# Patient Record
Sex: FEMALE | Race: BLACK OR AFRICAN AMERICAN | NOT HISPANIC OR LATINO | Employment: OTHER | URBAN - METROPOLITAN AREA
[De-identification: names, ages, dates, MRNs, and addresses within clinical notes are randomized per-mention and may not be internally consistent; named-entity substitution may affect disease eponyms.]

---

## 2020-02-05 LAB
HUMAN PAPILLOMAVIRUS (HPV): NORMAL
HUMAN PAPILLOMAVIRUS (HPV): NORMAL

## 2020-02-06 ENCOUNTER — OFFICE VISIT (OUTPATIENT)
Dept: FAMILY MEDICINE | Facility: CLINIC | Age: 65
End: 2020-02-06
Payer: COMMERCIAL

## 2020-02-06 VITALS
HEART RATE: 80 BPM | TEMPERATURE: 98 F | OXYGEN SATURATION: 98 % | DIASTOLIC BLOOD PRESSURE: 82 MMHG | BODY MASS INDEX: 29.31 KG/M2 | HEIGHT: 63 IN | WEIGHT: 165.44 LBS | SYSTOLIC BLOOD PRESSURE: 124 MMHG

## 2020-02-06 DIAGNOSIS — A60.00 GENITAL HERPES SIMPLEX, UNSPECIFIED SITE: ICD-10-CM

## 2020-02-06 DIAGNOSIS — Z00.00 ANNUAL PHYSICAL EXAM: ICD-10-CM

## 2020-02-06 DIAGNOSIS — E78.2 HYPERLIPIDEMIA, MIXED: ICD-10-CM

## 2020-02-06 DIAGNOSIS — I10 ESSENTIAL HYPERTENSION: Primary | ICD-10-CM

## 2020-02-06 DIAGNOSIS — Z12.11 SCREEN FOR COLON CANCER: ICD-10-CM

## 2020-02-06 PROCEDURE — 99999 PR PBB SHADOW E&M-NEW PATIENT-LVL III: CPT | Mod: PBBFAC,,, | Performed by: INTERNAL MEDICINE

## 2020-02-06 PROCEDURE — 99999 PR PBB SHADOW E&M-NEW PATIENT-LVL III: ICD-10-PCS | Mod: PBBFAC,,, | Performed by: INTERNAL MEDICINE

## 2020-02-06 PROCEDURE — 99386 PREV VISIT NEW AGE 40-64: CPT | Mod: S$GLB,,, | Performed by: INTERNAL MEDICINE

## 2020-02-06 PROCEDURE — 99386 PR PREVENTIVE VISIT,NEW,40-64: ICD-10-PCS | Mod: S$GLB,,, | Performed by: INTERNAL MEDICINE

## 2020-02-06 RX ORDER — ACYCLOVIR 400 MG/1
TABLET ORAL 2 TIMES DAILY
COMMUNITY
End: 2020-02-06 | Stop reason: SDUPTHER

## 2020-02-06 RX ORDER — ATORVASTATIN CALCIUM 10 MG/1
10 TABLET, FILM COATED ORAL DAILY
COMMUNITY
End: 2020-02-06 | Stop reason: SDUPTHER

## 2020-02-06 RX ORDER — ATORVASTATIN CALCIUM 10 MG/1
10 TABLET, FILM COATED ORAL DAILY
Qty: 90 TABLET | Refills: 3 | Status: SHIPPED | OUTPATIENT
Start: 2020-02-06 | End: 2021-01-20 | Stop reason: SDUPTHER

## 2020-02-06 RX ORDER — ACYCLOVIR 400 MG/1
400 TABLET ORAL 2 TIMES DAILY
Qty: 60 TABLET | Refills: 11 | Status: SHIPPED | OUTPATIENT
Start: 2020-02-06 | End: 2021-02-09

## 2020-02-06 RX ORDER — AMLODIPINE BESYLATE 10 MG/1
10 TABLET ORAL DAILY
COMMUNITY
End: 2020-02-06 | Stop reason: SDUPTHER

## 2020-02-06 RX ORDER — AMLODIPINE BESYLATE 5 MG/1
5 TABLET ORAL DAILY
Qty: 90 TABLET | Refills: 3 | Status: SHIPPED | OUTPATIENT
Start: 2020-02-06 | End: 2021-01-20 | Stop reason: SDUPTHER

## 2020-02-06 RX ORDER — HYDROCORTISONE 25 MG/G
CREAM TOPICAL 2 TIMES DAILY
Qty: 28 G | Refills: 11 | Status: SHIPPED | OUTPATIENT
Start: 2020-02-06 | End: 2021-08-10

## 2020-02-06 NOTE — PROGRESS NOTES
[facility] Note    Chief Complaint      Chief Complaint   Patient presents with    Establish Care       History of Present Illness      Kierra Acosta is a 64 y.o. female with chronic conditions of HTN, HLD, genital herpes who presents today for: re-establish care from  and annual preventative visit.  Complains of vertigo episode  HTN: BP at goal on amlodipine as needed.  Checks BP every day and usually 120s/80s.    HLD: Controlled on atorvastatin.  LDL due for repeat.  Genital herpes: Uses acyclovir and hydrocortisone cream as needed.  Diet: Prepares own food mostly.  Needs to get back to limiting fatty foods and carbs.  Needs to drink more water.  Exercise: Plans to start riding bike.    Denies drinking and driving, drinking more than 4 drinks on occasion, drug use.     FLu shot declines.  Td declines.  Shingles vaccine declines.  Pneumonia vaccine due age 66.    Mammogram scheduled with Dr. Felder.  PAP smear UTD.  Cscope due with Dr. Azevedo.     Past Medical History:  History reviewed. No pertinent past medical history.    Past Surgical History:   has no past surgical history on file.    Family History:  family history includes Hyperlipidemia in her mother; Hypertension in her mother; No Known Problems in her father.     Social History:  Social History     Tobacco Use    Smoking status: Never Smoker   Substance Use Topics    Alcohol use: Yes    Drug use: Not on file       Review of Systems   Constitutional: Negative for chills, fever and malaise/fatigue.   Respiratory: Negative for shortness of breath.    Cardiovascular: Negative for chest pain.   Gastrointestinal: Negative for constipation, diarrhea, nausea and vomiting.   Skin: Negative for rash.   Neurological: Negative for weakness.        Medications:  Outpatient Encounter Medications as of 2/6/2020   Medication Sig Dispense Refill    acyclovir (ZOVIRAX) 400 MG tablet Take 1 tablet (400 mg total) by mouth 2 (two) times daily. 60  "tablet 11    amLODIPine (NORVASC) 5 MG tablet Take 1 tablet (5 mg total) by mouth once daily. 90 tablet 3    atorvastatin (LIPITOR) 10 MG tablet Take 1 tablet (10 mg total) by mouth once daily. 90 tablet 3    [DISCONTINUED] acyclovir (ZOVIRAX) 400 MG tablet Take by mouth 2 (two) times daily.      [DISCONTINUED] amLODIPine (NORVASC) 10 MG tablet Take 10 mg by mouth once daily.      [DISCONTINUED] atorvastatin (LIPITOR) 10 MG tablet Take 10 mg by mouth once daily.      hydrocortisone 2.5 % cream Apply topically 2 (two) times daily. 28 g 11     No facility-administered encounter medications on file as of 2/6/2020.        Allergies:  Review of patient's allergies indicates:   Allergen Reactions    Sulfamethoxazole-trimethoprim      Other reaction(s): Cutaneous eruption (morphologic abnormality)       Health Maintenance:    There is no immunization history on file for this patient.   Health Maintenance   Topic Date Due    Hepatitis C Screening  1955    Lipid Panel  1955    TETANUS VACCINE  11/08/1974    Pap Smear with HPV Cotest  11/08/1977    Mammogram  11/08/1996    Colonoscopy  11/08/2006        Physical Exam      Vital Signs  Temp: 97.9 °F (36.6 °C)  Temp src: Oral  Pulse: 80  SpO2: 98 %  BP: 124/82  BP Location: Right arm  Patient Position: Sitting  Pain Score: 0-No pain  Height and Weight  Height: 5' 3" (160 cm)  Weight: 75 kg (165 lb 7.3 oz)  BSA (Calculated - sq m): 1.83 sq meters  BMI (Calculated): 29.3  Weight in (lb) to have BMI = 25: 140.8]    Physical Exam   Constitutional: She appears well-developed and well-nourished.   HENT:   Head: Normocephalic and atraumatic.   Right Ear: External ear normal.   Left Ear: External ear normal.   Mouth/Throat: Oropharynx is clear and moist.   Eyes: Pupils are equal, round, and reactive to light. Conjunctivae and EOM are normal.   Neck: Carotid bruit is not present.   Cardiovascular: Normal rate, regular rhythm, normal heart sounds and intact " distal pulses.   No murmur heard.  Pulmonary/Chest: Effort normal and breath sounds normal. She has no wheezes. She has no rales.   Abdominal: Soft. Bowel sounds are normal. She exhibits no distension. There is no hepatosplenomegaly. There is no tenderness.   Musculoskeletal: She exhibits no edema.   Vitals reviewed.       Laboratory:  CBC:      CMP:        Invalid input(s): CREATININ  URINALYSIS:       LIPIDS:      TSH:      A1C:        Assessment/Plan     Kierra Acosta is a 64 y.o.female with:    1. Annual physical exam  - CBC auto differential; Future  - Comprehensive metabolic panel; Future  - Lipid panel; Future  - TSH; Future  - T4, free; Future  - CBC auto differential  - Comprehensive metabolic panel  - Lipid panel  - TSH  - T4, free  Discussed diet and exercise, vaccines and cancer screening, risk factors.  Screening labs ordered.     2. Essential hypertension  - amLODIPine (NORVASC) 5 MG tablet; Take 1 tablet (5 mg total) by mouth once daily.  Dispense: 90 tablet; Refill: 3  - CBC auto differential; Future  - Comprehensive metabolic panel; Future  - Lipid panel; Future  - TSH; Future  - T4, free; Future  - CBC auto differential  - Comprehensive metabolic panel  - Lipid panel  - TSH  - T4, free  Continue current meds.  Reducing amlodipine dose to give pt confidence to take more regularly.    3. Hyperlipidemia, mixed  - atorvastatin (LIPITOR) 10 MG tablet; Take 1 tablet (10 mg total) by mouth once daily.  Dispense: 90 tablet; Refill: 3  - CBC auto differential; Future  - Comprehensive metabolic panel; Future  - Lipid panel; Future  - TSH; Future  - T4, free; Future  - CBC auto differential  - Comprehensive metabolic panel  - Lipid panel  - TSH  - T4, free  Continue current meds.  Update labs  4. Genital herpes simplex, unspecified site  - acyclovir (ZOVIRAX) 400 MG tablet; Take 1 tablet (400 mg total) by mouth 2 (two) times daily.  Dispense: 60 tablet; Refill: 11  - hydrocortisone 2.5 % cream; Apply  topically 2 (two) times daily.  Dispense: 28 g; Refill: 11  Continue current meds.    5. Screen for colon cancer  - Ambulatory referral/consult to Gastroenterology; Future  Dr. Azevedo.    Chronic conditions status updated as per HPI.  Other than changes above, cont current medications and maintain follow up with specialists.  Return to clinic in 6 months.    Chon Sauer MD  [facility]

## 2020-02-08 LAB
ALBUMIN SERPL-MCNC: 4 G/DL (ref 3.6–5.1)
ALBUMIN/GLOB SERPL: 1.3 (CALC) (ref 1–2.5)
ALP SERPL-CCNC: 83 U/L (ref 37–153)
ALT SERPL-CCNC: 17 U/L (ref 6–29)
AST SERPL-CCNC: 18 U/L (ref 10–35)
BASOPHILS # BLD AUTO: 63 CELLS/UL (ref 0–200)
BASOPHILS NFR BLD AUTO: 0.9 %
BILIRUB SERPL-MCNC: 0.7 MG/DL (ref 0.2–1.2)
BUN SERPL-MCNC: 16 MG/DL (ref 7–25)
BUN/CREAT SERPL: NORMAL (CALC) (ref 6–22)
CALCIUM SERPL-MCNC: 9.6 MG/DL (ref 8.6–10.4)
CHLORIDE SERPL-SCNC: 102 MMOL/L (ref 98–110)
CHOLEST SERPL-MCNC: 195 MG/DL
CHOLEST/HDLC SERPL: 3.1 (CALC)
CO2 SERPL-SCNC: 31 MMOL/L (ref 20–32)
CREAT SERPL-MCNC: 0.79 MG/DL (ref 0.5–0.99)
EOSINOPHIL # BLD AUTO: 336 CELLS/UL (ref 15–500)
EOSINOPHIL NFR BLD AUTO: 4.8 %
ERYTHROCYTE [DISTWIDTH] IN BLOOD BY AUTOMATED COUNT: 14.2 % (ref 11–15)
GFRSERPLBLD MDRD-ARVRAT: 80 ML/MIN/1.73M2
GLOBULIN SER CALC-MCNC: 3 G/DL (CALC) (ref 1.9–3.7)
GLUCOSE SERPL-MCNC: 91 MG/DL (ref 65–99)
HCT VFR BLD AUTO: 41.7 % (ref 35–45)
HDLC SERPL-MCNC: 62 MG/DL
HGB BLD-MCNC: 13.7 G/DL (ref 11.7–15.5)
LDLC SERPL CALC-MCNC: 115 MG/DL (CALC)
LYMPHOCYTES # BLD AUTO: 2170 CELLS/UL (ref 850–3900)
LYMPHOCYTES NFR BLD AUTO: 31 %
MCH RBC QN AUTO: 27.5 PG (ref 27–33)
MCHC RBC AUTO-ENTMCNC: 32.9 G/DL (ref 32–36)
MCV RBC AUTO: 83.7 FL (ref 80–100)
MONOCYTES # BLD AUTO: 476 CELLS/UL (ref 200–950)
MONOCYTES NFR BLD AUTO: 6.8 %
NEUTROPHILS # BLD AUTO: 3955 CELLS/UL (ref 1500–7800)
NEUTROPHILS NFR BLD AUTO: 56.5 %
NONHDLC SERPL-MCNC: 133 MG/DL (CALC)
PLATELET # BLD AUTO: 254 THOUSAND/UL (ref 140–400)
PMV BLD REES-ECKER: 11.6 FL (ref 7.5–12.5)
POTASSIUM SERPL-SCNC: 4.8 MMOL/L (ref 3.5–5.3)
PROT SERPL-MCNC: 7 G/DL (ref 6.1–8.1)
RBC # BLD AUTO: 4.98 MILLION/UL (ref 3.8–5.1)
SODIUM SERPL-SCNC: 141 MMOL/L (ref 135–146)
TRIGL SERPL-MCNC: 82 MG/DL
TSH SERPL-ACNC: 2.07 MIU/L (ref 0.4–4.5)
WBC # BLD AUTO: 7 THOUSAND/UL (ref 3.8–10.8)

## 2020-02-13 ENCOUNTER — PATIENT OUTREACH (OUTPATIENT)
Dept: ADMINISTRATIVE | Facility: HOSPITAL | Age: 65
End: 2020-02-13

## 2020-05-08 DIAGNOSIS — Z12.39 BREAST CANCER SCREENING: ICD-10-CM

## 2020-06-26 ENCOUNTER — TELEPHONE (OUTPATIENT)
Dept: FAMILY MEDICINE | Facility: CLINIC | Age: 65
End: 2020-06-26

## 2020-06-26 NOTE — TELEPHONE ENCOUNTER
----- Message from Glenna Cunha sent at 6/26/2020  9:36 AM CDT -----  Contact: Lexii he/ Dr. Jaclyn Woodson   Office is requesting copy of labs results to be sent to fax . Please advise

## 2020-07-24 DIAGNOSIS — Z12.39 BREAST CANCER SCREENING: ICD-10-CM

## 2020-08-06 ENCOUNTER — OFFICE VISIT (OUTPATIENT)
Dept: FAMILY MEDICINE | Facility: CLINIC | Age: 65
End: 2020-08-06
Payer: COMMERCIAL

## 2020-08-06 VITALS
SYSTOLIC BLOOD PRESSURE: 132 MMHG | HEIGHT: 63 IN | DIASTOLIC BLOOD PRESSURE: 80 MMHG | BODY MASS INDEX: 29.46 KG/M2 | OXYGEN SATURATION: 98 % | WEIGHT: 166.25 LBS | TEMPERATURE: 98 F | HEART RATE: 80 BPM

## 2020-08-06 DIAGNOSIS — Z11.59 SCREENING FOR VIRAL DISEASE: ICD-10-CM

## 2020-08-06 DIAGNOSIS — Z11.4 SCREENING FOR HIV (HUMAN IMMUNODEFICIENCY VIRUS): ICD-10-CM

## 2020-08-06 DIAGNOSIS — Z00.00 ANNUAL PHYSICAL EXAM: ICD-10-CM

## 2020-08-06 DIAGNOSIS — A60.00 GENITAL HERPES SIMPLEX, UNSPECIFIED SITE: ICD-10-CM

## 2020-08-06 DIAGNOSIS — I10 ESSENTIAL HYPERTENSION: Primary | ICD-10-CM

## 2020-08-06 DIAGNOSIS — E78.2 HYPERLIPIDEMIA, MIXED: ICD-10-CM

## 2020-08-06 PROCEDURE — 99214 PR OFFICE/OUTPT VISIT, EST, LEVL IV, 30-39 MIN: ICD-10-PCS | Mod: S$GLB,,, | Performed by: INTERNAL MEDICINE

## 2020-08-06 PROCEDURE — 99999 PR PBB SHADOW E&M-EST. PATIENT-LVL IV: ICD-10-PCS | Mod: PBBFAC,,, | Performed by: INTERNAL MEDICINE

## 2020-08-06 PROCEDURE — 99999 PR PBB SHADOW E&M-EST. PATIENT-LVL IV: CPT | Mod: PBBFAC,,, | Performed by: INTERNAL MEDICINE

## 2020-08-06 PROCEDURE — 99214 OFFICE O/P EST MOD 30 MIN: CPT | Mod: S$GLB,,, | Performed by: INTERNAL MEDICINE

## 2020-08-06 RX ORDER — HYDROXYZINE HYDROCHLORIDE 25 MG/1
TABLET, FILM COATED ORAL
COMMUNITY
Start: 2020-05-29 | End: 2021-08-10

## 2020-08-06 RX ORDER — TRIAMCINOLONE ACETONIDE 1 MG/G
CREAM TOPICAL
COMMUNITY
Start: 2020-05-29

## 2020-08-06 RX ORDER — LEVOCETIRIZINE DIHYDROCHLORIDE 5 MG/1
5 TABLET, FILM COATED ORAL NIGHTLY PRN
COMMUNITY
Start: 2020-07-08 | End: 2023-02-07

## 2020-08-06 RX ORDER — CETIRIZINE HYDROCHLORIDE 10 MG/1
10 TABLET ORAL DAILY PRN
COMMUNITY
Start: 2020-07-27 | End: 2021-08-10

## 2020-08-06 NOTE — PROGRESS NOTES
[facility] Note    Chief Complaint      Chief Complaint   Patient presents with    Hypertension     6 month f/u       History of Present Illness      Kierra Acosta is a 64 y.o. female with chronic conditions of  who presents today for: follow up chronic conditions.  Has been following up with dermatologist Dr. Salgado and allergist Dr. Lyons for chronic urticaria.  On zyrtec/xyzal.  Has had allergy testing: cockroaches, dust mites, shrimp.    HTN: BP at goal on amlodipine.  HLD: Controlled on atorvastatin.   last check.  Genital herpes: Uses acyclovir and hydrocortisone cream as needed.  FLu shot declines.  Td declines.  Shingles vaccine declines.  Pneumonia vaccine due age 66.    Mammogram scheduled on 8/14/2020 with Dr. Felder.  PAP smear UTD.  Cscope UTD 2020 with Dr. Azevedo, requesting report.     Past Medical History:  History reviewed. No pertinent past medical history.    Past Surgical History:   has no past surgical history on file.    Family History:  family history includes Hyperlipidemia in her mother; Hypertension in her mother; No Known Problems in her father.     Social History:  Social History     Tobacco Use    Smoking status: Never Smoker   Substance Use Topics    Alcohol use: Yes     Alcohol/week: 0.0 standard drinks     Frequency: Monthly or less     Drinks per session: 1 or 2     Binge frequency: Never     Comment: May have 1-2. Do not drink every week. Beer/wine    Drug use: Never       Review of Systems   Constitutional: Negative for chills, fever and malaise/fatigue.   HENT: Negative for hearing loss.    Eyes: Negative for discharge.   Respiratory: Negative for shortness of breath and wheezing.    Cardiovascular: Negative for chest pain and palpitations.   Gastrointestinal: Negative for blood in stool, constipation, diarrhea, nausea and vomiting.   Genitourinary: Negative for dysuria and hematuria.   Musculoskeletal: Negative for neck pain.   Skin: Negative  "for rash.   Neurological: Negative for weakness and headaches.   Endo/Heme/Allergies: Negative for polydipsia.        Medications:  Outpatient Encounter Medications as of 8/6/2020   Medication Sig Dispense Refill    acyclovir (ZOVIRAX) 400 MG tablet Take 1 tablet (400 mg total) by mouth 2 (two) times daily. 60 tablet 11    amLODIPine (NORVASC) 5 MG tablet Take 1 tablet (5 mg total) by mouth once daily. 90 tablet 3    atorvastatin (LIPITOR) 10 MG tablet Take 1 tablet (10 mg total) by mouth once daily. 90 tablet 3    cetirizine (ZYRTEC) 10 MG tablet       hydrocortisone 2.5 % cream Apply topically 2 (two) times daily. 28 g 11    hydrOXYzine HCL (ATARAX) 25 MG tablet       levocetirizine (XYZAL) 5 MG tablet       triamcinolone acetonide 0.1% (KENALOG) 0.1 % cream MANAS EXT AA BID FOR UP TO TWO WEEKS TO RASH ON BODY       No facility-administered encounter medications on file as of 8/6/2020.        Allergies:  Review of patient's allergies indicates:   Allergen Reactions    Sulfamethoxazole-trimethoprim      Other reaction(s): Cutaneous eruption (morphologic abnormality)       Health Maintenance:    There is no immunization history on file for this patient.   Health Maintenance   Topic Date Due    Hepatitis C Screening  1955    TETANUS VACCINE  11/08/1974    Mammogram  11/08/1996    Pap Smear with HPV Cotest  02/05/2025    Lipid Panel  02/07/2025        Physical Exam      Vital Signs  Temp: 97.7 °F (36.5 °C)  Temp src: Oral  Pulse: 80  SpO2: 98 %  BP: 132/80  BP Location: Left arm  Patient Position: Sitting  Pain Score: 0-No pain  Height and Weight  Height: 5' 3" (160 cm)  Weight: 75.4 kg (166 lb 3.6 oz)  BSA (Calculated - sq m): 1.83 sq meters  BMI (Calculated): 29.5  Weight in (lb) to have BMI = 25: 140.8]    Physical Exam  Vitals signs reviewed.   Constitutional:       Appearance: She is well-developed.   HENT:      Head: Normocephalic and atraumatic.      Right Ear: External ear normal.      Left " Ear: External ear normal.   Eyes:      Conjunctiva/sclera: Conjunctivae normal.      Pupils: Pupils are equal, round, and reactive to light.   Neck:      Vascular: No carotid bruit.   Cardiovascular:      Rate and Rhythm: Normal rate and regular rhythm.      Heart sounds: Normal heart sounds. No murmur.   Pulmonary:      Effort: Pulmonary effort is normal.      Breath sounds: Normal breath sounds. No wheezing or rales.   Abdominal:      General: Bowel sounds are normal. There is no distension.      Palpations: Abdomen is soft.      Tenderness: There is no abdominal tenderness.          Laboratory:  CBC:  Recent Labs   Lab 02/07/20  0644   WBC 7.0   RBC 4.98   Hemoglobin 13.7   Hematocrit 41.7   Platelets 254   Mean Corpuscular Volume 83.7   Mean Corpuscular Hemoglobin 27.5   Mean Corpuscular Hemoglobin Conc 32.9     CMP:  Recent Labs   Lab 02/07/20  0644   Glucose 91   Calcium 9.6   Albumin 4.0   Total Protein 7.0   Sodium 141   Potassium 4.8   CO2 31   Chloride 102   BUN, Bld 16   Alkaline Phosphatase 83   ALT 17   AST 18   Total Bilirubin 0.7     URINALYSIS:       LIPIDS:  Recent Labs   Lab 02/07/20  0644   TSH 2.07   HDL 62   Cholesterol 195   Triglycerides 82   LDL Cholesterol 115 H   Hdl/Cholesterol Ratio 3.1   Non HDL Chol. (LDL+VLDL) 133 H     TSH:  Recent Labs   Lab 02/07/20  0644   TSH 2.07     A1C:        Assessment/Plan     Kierra Acosta is a 64 y.o.female with:    1. Essential hypertension  Continue current meds.    2. Hyperlipidemia, mixed  Continue current meds.  Labs due next visit.  3. Genital herpes simplex, unspecified site  Continue current meds as needed    Chronic conditions status updated as per HPI.  Other than changes above, cont current medications and maintain follow up with specialists.  Return to clinic in 6 months.    Chon Sauer MD  [facility]        Answers for HPI/ROS submitted by the patient on 8/3/2020   activity change: No  unexpected weight change: No  rhinorrhea:  No  trouble swallowing: No  visual disturbance: No  chest tightness: No  polyuria: No  difficulty urinating: No  menstrual problem: No  joint swelling: No  arthralgias: No  confusion: No  dysphoric mood: No

## 2020-08-10 ENCOUNTER — TELEPHONE (OUTPATIENT)
Dept: ADMINISTRATIVE | Facility: HOSPITAL | Age: 65
End: 2020-08-10

## 2020-08-26 ENCOUNTER — TELEPHONE (OUTPATIENT)
Dept: ADMINISTRATIVE | Facility: HOSPITAL | Age: 65
End: 2020-08-26

## 2020-08-26 ENCOUNTER — PATIENT OUTREACH (OUTPATIENT)
Dept: ADMINISTRATIVE | Facility: HOSPITAL | Age: 65
End: 2020-08-26

## 2020-08-27 ENCOUNTER — TELEPHONE (OUTPATIENT)
Dept: ADMINISTRATIVE | Facility: HOSPITAL | Age: 65
End: 2020-08-27

## 2020-10-05 ENCOUNTER — PATIENT MESSAGE (OUTPATIENT)
Dept: INTERNAL MEDICINE | Facility: CLINIC | Age: 65
End: 2020-10-05

## 2021-01-04 ENCOUNTER — PATIENT MESSAGE (OUTPATIENT)
Dept: ADMINISTRATIVE | Facility: HOSPITAL | Age: 66
End: 2021-01-04

## 2021-01-20 DIAGNOSIS — E78.2 HYPERLIPIDEMIA, MIXED: ICD-10-CM

## 2021-01-20 DIAGNOSIS — Z79.899 ENCOUNTER FOR LONG-TERM (CURRENT) USE OF OTHER MEDICATIONS: Primary | ICD-10-CM

## 2021-01-20 DIAGNOSIS — I10 ESSENTIAL HYPERTENSION: ICD-10-CM

## 2021-01-22 RX ORDER — ATORVASTATIN CALCIUM 10 MG/1
10 TABLET, FILM COATED ORAL DAILY
Qty: 90 TABLET | Refills: 0 | Status: SHIPPED | OUTPATIENT
Start: 2021-01-22 | End: 2021-04-20 | Stop reason: SDUPTHER

## 2021-01-22 RX ORDER — AMLODIPINE BESYLATE 5 MG/1
5 TABLET ORAL DAILY
Qty: 90 TABLET | Refills: 2 | Status: SHIPPED | OUTPATIENT
Start: 2021-01-22 | End: 2021-10-18

## 2021-02-09 ENCOUNTER — TELEPHONE (OUTPATIENT)
Dept: ADMINISTRATIVE | Facility: HOSPITAL | Age: 66
End: 2021-02-09

## 2021-02-09 ENCOUNTER — OFFICE VISIT (OUTPATIENT)
Dept: FAMILY MEDICINE | Facility: CLINIC | Age: 66
End: 2021-02-09
Payer: COMMERCIAL

## 2021-02-09 VITALS
BODY MASS INDEX: 28.79 KG/M2 | DIASTOLIC BLOOD PRESSURE: 80 MMHG | HEART RATE: 74 BPM | SYSTOLIC BLOOD PRESSURE: 132 MMHG | OXYGEN SATURATION: 96 % | TEMPERATURE: 98 F | HEIGHT: 63 IN | WEIGHT: 162.5 LBS | RESPIRATION RATE: 16 BRPM

## 2021-02-09 DIAGNOSIS — A60.00 GENITAL HERPES SIMPLEX, UNSPECIFIED SITE: ICD-10-CM

## 2021-02-09 DIAGNOSIS — E78.2 HYPERLIPIDEMIA, MIXED: ICD-10-CM

## 2021-02-09 DIAGNOSIS — Z00.00 ANNUAL PHYSICAL EXAM: Primary | ICD-10-CM

## 2021-02-09 DIAGNOSIS — Z11.59 SCREENING FOR VIRAL DISEASE: ICD-10-CM

## 2021-02-09 DIAGNOSIS — Z11.4 SCREENING FOR HIV (HUMAN IMMUNODEFICIENCY VIRUS): ICD-10-CM

## 2021-02-09 DIAGNOSIS — I10 ESSENTIAL HYPERTENSION: ICD-10-CM

## 2021-02-09 PROCEDURE — 99999 PR PBB SHADOW E&M-EST. PATIENT-LVL IV: CPT | Mod: PBBFAC,,, | Performed by: INTERNAL MEDICINE

## 2021-02-09 PROCEDURE — 99396 PR PREVENTIVE VISIT,EST,40-64: ICD-10-PCS | Mod: S$GLB,,, | Performed by: INTERNAL MEDICINE

## 2021-02-09 PROCEDURE — 99396 PREV VISIT EST AGE 40-64: CPT | Mod: S$GLB,,, | Performed by: INTERNAL MEDICINE

## 2021-02-09 PROCEDURE — 99999 PR PBB SHADOW E&M-EST. PATIENT-LVL IV: ICD-10-PCS | Mod: PBBFAC,,, | Performed by: INTERNAL MEDICINE

## 2021-02-09 RX ORDER — ACYCLOVIR 400 MG/1
400 TABLET ORAL DAILY
Qty: 90 TABLET | Refills: 3
Start: 2021-02-09 | End: 2021-08-02 | Stop reason: SDUPTHER

## 2021-02-17 ENCOUNTER — TELEPHONE (OUTPATIENT)
Dept: FAMILY MEDICINE | Facility: CLINIC | Age: 66
End: 2021-02-17

## 2021-04-20 DIAGNOSIS — E78.2 HYPERLIPIDEMIA, MIXED: ICD-10-CM

## 2021-04-20 RX ORDER — ATORVASTATIN CALCIUM 10 MG/1
10 TABLET, FILM COATED ORAL DAILY
Qty: 90 TABLET | Refills: 3 | Status: SHIPPED | OUTPATIENT
Start: 2021-04-20 | End: 2022-04-14

## 2021-08-02 DIAGNOSIS — A60.00 GENITAL HERPES SIMPLEX, UNSPECIFIED SITE: ICD-10-CM

## 2021-08-02 RX ORDER — ACYCLOVIR 400 MG/1
400 TABLET ORAL DAILY
Qty: 90 TABLET | Refills: 3
Start: 2021-08-02 | End: 2021-08-12 | Stop reason: SDUPTHER

## 2021-08-10 ENCOUNTER — OFFICE VISIT (OUTPATIENT)
Dept: FAMILY MEDICINE | Facility: CLINIC | Age: 66
End: 2021-08-10
Payer: COMMERCIAL

## 2021-08-10 VITALS
HEIGHT: 63 IN | BODY MASS INDEX: 28.97 KG/M2 | SYSTOLIC BLOOD PRESSURE: 130 MMHG | TEMPERATURE: 98 F | HEART RATE: 74 BPM | WEIGHT: 163.5 LBS | DIASTOLIC BLOOD PRESSURE: 88 MMHG | OXYGEN SATURATION: 99 %

## 2021-08-10 DIAGNOSIS — A60.00 GENITAL HERPES SIMPLEX, UNSPECIFIED SITE: ICD-10-CM

## 2021-08-10 DIAGNOSIS — I10 ESSENTIAL HYPERTENSION: Primary | ICD-10-CM

## 2021-08-10 DIAGNOSIS — E78.2 HYPERLIPIDEMIA, MIXED: ICD-10-CM

## 2021-08-10 PROCEDURE — 99214 OFFICE O/P EST MOD 30 MIN: CPT | Mod: S$GLB,,, | Performed by: INTERNAL MEDICINE

## 2021-08-10 PROCEDURE — 99999 PR PBB SHADOW E&M-EST. PATIENT-LVL III: CPT | Mod: PBBFAC,,, | Performed by: INTERNAL MEDICINE

## 2021-08-10 PROCEDURE — 99999 PR PBB SHADOW E&M-EST. PATIENT-LVL III: ICD-10-PCS | Mod: PBBFAC,,, | Performed by: INTERNAL MEDICINE

## 2021-08-10 PROCEDURE — 99214 PR OFFICE/OUTPT VISIT, EST, LEVL IV, 30-39 MIN: ICD-10-PCS | Mod: S$GLB,,, | Performed by: INTERNAL MEDICINE

## 2021-08-11 ENCOUNTER — TELEPHONE (OUTPATIENT)
Dept: FAMILY MEDICINE | Facility: CLINIC | Age: 66
End: 2021-08-11

## 2021-08-12 DIAGNOSIS — A60.00 GENITAL HERPES SIMPLEX, UNSPECIFIED SITE: ICD-10-CM

## 2021-08-12 RX ORDER — ACYCLOVIR 400 MG/1
400 TABLET ORAL DAILY
Qty: 90 TABLET | Refills: 3 | Status: SHIPPED | OUTPATIENT
Start: 2021-08-12 | End: 2022-02-08 | Stop reason: SDUPTHER

## 2021-09-29 DIAGNOSIS — Z12.31 OTHER SCREENING MAMMOGRAM: ICD-10-CM

## 2021-10-05 ENCOUNTER — PATIENT MESSAGE (OUTPATIENT)
Dept: ADMINISTRATIVE | Facility: HOSPITAL | Age: 66
End: 2021-10-05

## 2021-10-17 DIAGNOSIS — I10 ESSENTIAL HYPERTENSION: ICD-10-CM

## 2021-10-18 RX ORDER — AMLODIPINE BESYLATE 5 MG/1
TABLET ORAL
Qty: 90 TABLET | Refills: 3 | Status: SHIPPED | OUTPATIENT
Start: 2021-10-18 | End: 2022-07-15

## 2021-12-29 ENCOUNTER — PATIENT MESSAGE (OUTPATIENT)
Dept: INTERNAL MEDICINE | Facility: CLINIC | Age: 66
End: 2021-12-29
Payer: COMMERCIAL

## 2022-01-10 ENCOUNTER — PATIENT MESSAGE (OUTPATIENT)
Dept: ADMINISTRATIVE | Facility: HOSPITAL | Age: 67
End: 2022-01-10
Payer: COMMERCIAL

## 2022-01-13 ENCOUNTER — PATIENT OUTREACH (OUTPATIENT)
Dept: ADMINISTRATIVE | Facility: HOSPITAL | Age: 67
End: 2022-01-13
Payer: COMMERCIAL

## 2022-01-13 ENCOUNTER — TELEPHONE (OUTPATIENT)
Dept: ADMINISTRATIVE | Facility: HOSPITAL | Age: 67
End: 2022-01-13
Payer: COMMERCIAL

## 2022-02-02 LAB
ALBUMIN SERPL-MCNC: 4 G/DL (ref 3.6–5.1)
ALBUMIN/GLOB SERPL: 1.4 (CALC) (ref 1–2.5)
ALP SERPL-CCNC: 79 U/L (ref 37–153)
ALT SERPL-CCNC: 15 U/L (ref 6–29)
AST SERPL-CCNC: 18 U/L (ref 10–35)
BASOPHILS # BLD AUTO: 57 CELLS/UL (ref 0–200)
BASOPHILS NFR BLD AUTO: 0.7 %
BILIRUB SERPL-MCNC: 0.8 MG/DL (ref 0.2–1.2)
BUN SERPL-MCNC: 15 MG/DL (ref 7–25)
BUN/CREAT SERPL: NORMAL (CALC) (ref 6–22)
CALCIUM SERPL-MCNC: 9.2 MG/DL (ref 8.6–10.4)
CHLORIDE SERPL-SCNC: 104 MMOL/L (ref 98–110)
CHOLEST SERPL-MCNC: 169 MG/DL
CHOLEST/HDLC SERPL: 3.1 (CALC)
CO2 SERPL-SCNC: 28 MMOL/L (ref 20–32)
CREAT SERPL-MCNC: 0.75 MG/DL (ref 0.5–0.99)
EOSINOPHIL # BLD AUTO: 410 CELLS/UL (ref 15–500)
EOSINOPHIL NFR BLD AUTO: 5 %
ERYTHROCYTE [DISTWIDTH] IN BLOOD BY AUTOMATED COUNT: 14.4 % (ref 11–15)
GLOBULIN SER CALC-MCNC: 2.8 G/DL (CALC) (ref 1.9–3.7)
GLUCOSE SERPL-MCNC: 84 MG/DL (ref 65–99)
HCT VFR BLD AUTO: 40.7 % (ref 35–45)
HDLC SERPL-MCNC: 54 MG/DL
HGB BLD-MCNC: 13.3 G/DL (ref 11.7–15.5)
LDLC SERPL CALC-MCNC: 99 MG/DL (CALC)
LYMPHOCYTES # BLD AUTO: 2288 CELLS/UL (ref 850–3900)
LYMPHOCYTES NFR BLD AUTO: 27.9 %
MCH RBC QN AUTO: 27.9 PG (ref 27–33)
MCHC RBC AUTO-ENTMCNC: 32.7 G/DL (ref 32–36)
MCV RBC AUTO: 85.3 FL (ref 80–100)
MONOCYTES # BLD AUTO: 656 CELLS/UL (ref 200–950)
MONOCYTES NFR BLD AUTO: 8 %
NEUTROPHILS # BLD AUTO: 4789 CELLS/UL (ref 1500–7800)
NEUTROPHILS NFR BLD AUTO: 58.4 %
NONHDLC SERPL-MCNC: 115 MG/DL (CALC)
PLATELET # BLD AUTO: 224 THOUSAND/UL (ref 140–400)
PMV BLD REES-ECKER: 11.1 FL (ref 7.5–12.5)
POTASSIUM SERPL-SCNC: 4.3 MMOL/L (ref 3.5–5.3)
PROT SERPL-MCNC: 6.8 G/DL (ref 6.1–8.1)
RBC # BLD AUTO: 4.77 MILLION/UL (ref 3.8–5.1)
SODIUM SERPL-SCNC: 141 MMOL/L (ref 135–146)
TRIGL SERPL-MCNC: 73 MG/DL
WBC # BLD AUTO: 8.2 THOUSAND/UL (ref 3.8–10.8)

## 2022-02-08 ENCOUNTER — PATIENT MESSAGE (OUTPATIENT)
Dept: INTERNAL MEDICINE | Facility: CLINIC | Age: 67
End: 2022-02-08

## 2022-02-08 ENCOUNTER — OFFICE VISIT (OUTPATIENT)
Dept: INTERNAL MEDICINE | Facility: CLINIC | Age: 67
End: 2022-02-08
Payer: COMMERCIAL

## 2022-02-08 VITALS
DIASTOLIC BLOOD PRESSURE: 86 MMHG | HEIGHT: 63 IN | HEART RATE: 93 BPM | SYSTOLIC BLOOD PRESSURE: 136 MMHG | WEIGHT: 161.25 LBS | BODY MASS INDEX: 28.57 KG/M2 | OXYGEN SATURATION: 98 % | TEMPERATURE: 98 F

## 2022-02-08 DIAGNOSIS — A60.00 GENITAL HERPES SIMPLEX, UNSPECIFIED SITE: ICD-10-CM

## 2022-02-08 DIAGNOSIS — Z00.00 ANNUAL PHYSICAL EXAM: Primary | ICD-10-CM

## 2022-02-08 DIAGNOSIS — I10 ESSENTIAL HYPERTENSION: ICD-10-CM

## 2022-02-08 DIAGNOSIS — E78.2 HYPERLIPIDEMIA, MIXED: ICD-10-CM

## 2022-02-08 DIAGNOSIS — Z78.0 POSTMENOPAUSAL: ICD-10-CM

## 2022-02-08 PROCEDURE — 99999 PR PBB SHADOW E&M-EST. PATIENT-LVL III: CPT | Mod: PBBFAC,,, | Performed by: INTERNAL MEDICINE

## 2022-02-08 PROCEDURE — 99397 PR PREVENTIVE VISIT,EST,65 & OVER: ICD-10-PCS | Mod: S$GLB,,, | Performed by: INTERNAL MEDICINE

## 2022-02-08 PROCEDURE — 99397 PER PM REEVAL EST PAT 65+ YR: CPT | Mod: S$GLB,,, | Performed by: INTERNAL MEDICINE

## 2022-02-08 PROCEDURE — 99999 PR PBB SHADOW E&M-EST. PATIENT-LVL III: ICD-10-PCS | Mod: PBBFAC,,, | Performed by: INTERNAL MEDICINE

## 2022-02-08 RX ORDER — ACYCLOVIR 400 MG/1
400 TABLET ORAL DAILY
Qty: 90 TABLET | Refills: 3 | Status: SHIPPED | OUTPATIENT
Start: 2022-02-08 | End: 2023-04-05

## 2022-02-08 NOTE — PROGRESS NOTES
[facility] Note    Chief Complaint      Chief Complaint   Patient presents with    Annual Exam       History of Present Illness      Kierra Acosta is a 66 y.o. female with chronic conditions of HTN, HLD, Genital herpes who presents today for: annual preventative visit.  HTN: BP at goal on amlodipine.   HLD: Controlled on atorvastatin.  LDL 99, improved from last check.    Genital herpes: Uses acyclovir and hydrocortisone cream as needed.     Diet: Prepares own food mostly.  Limiting fatty foods and carbs.  Drinks plenty water.  Exercise: Stays active but needs to get back to walking.  Denies drinking and driving, drinking more than 4 drinks on occasion, drug use.     FLu shot declines.  Td declines.  Shingles vaccine declines.  Pneumonia vaccine due age 66.  COVID vaccine UTD w/booster.  Mammogram 8/2021 with Dr. Felder.  PAP smear UTD. DEXA  Cscope UTD 2020 with Dr. Azevedo, no polyps, 10 yr interval.     Past Medical History:  Past Medical History:   Diagnosis Date    Hives        Past Surgical History:   has a past surgical history that includes Colonoscopy (03/04/2020).    Family History:  family history includes Hyperlipidemia in her mother; Hypertension in her mother; No Known Problems in her father.     Social History:  Social History     Tobacco Use    Smoking status: Never Smoker    Smokeless tobacco: Never Used   Substance Use Topics    Alcohol use: Yes     Alcohol/week: 0.0 standard drinks     Comment: May have 1-2. Do not drink every week. Beer/wine    Drug use: Never       I personally reviewed all past medical, surgical, social and family history.    Review of Systems   Constitutional: Negative for chills, fever and malaise/fatigue.   Respiratory: Negative for shortness of breath.    Cardiovascular: Negative for chest pain.   Gastrointestinal: Negative for constipation, diarrhea, nausea and vomiting.   Skin: Negative for rash.   Neurological: Negative for weakness.   All  "other systems reviewed and are negative.       Medications:  Outpatient Encounter Medications as of 2/8/2022   Medication Sig Dispense Refill    amLODIPine (NORVASC) 5 MG tablet TAKE 1 TABLET(5 MG) BY MOUTH EVERY DAY 90 tablet 3    atorvastatin (LIPITOR) 10 MG tablet Take 1 tablet (10 mg total) by mouth once daily. 90 tablet 3    levocetirizine (XYZAL) 5 MG tablet Take 5 mg by mouth nightly as needed.       acyclovir (ZOVIRAX) 400 MG tablet Take 1 tablet (400 mg total) by mouth once daily. 90 tablet 3    triamcinolone acetonide 0.1% (KENALOG) 0.1 % cream MANAS EXT AA BID FOR UP TO TWO WEEKS TO RASH ON BODY      [DISCONTINUED] acyclovir (ZOVIRAX) 400 MG tablet Take 1 tablet (400 mg total) by mouth once daily. 90 tablet 3     No facility-administered encounter medications on file as of 2/8/2022.       Allergies:  Review of patient's allergies indicates:   Allergen Reactions    Sulfamethoxazole-trimethoprim      Other reaction(s): Cutaneous eruption (morphologic abnormality)       Health Maintenance:  Immunization History   Administered Date(s) Administered    COVID-19, MRNA, LN-S, PF (Pfizer) (Purple Cap) 02/10/2021, 03/03/2021, 09/23/2021      Health Maintenance   Topic Date Due    TETANUS VACCINE  Never done    DEXA SCAN  Never done    Mammogram  08/27/2022    Pap Smear with HPV Cotest  02/05/2025    Lipid Panel  02/01/2027    Hepatitis C Screening  Completed        Physical Exam      Vital Signs  Temp: 97.9 °F (36.6 °C)  Pulse: 93  SpO2: 98 %  BP: 136/86  BP Location: Left arm  Patient Position: Sitting  Pain Score: 0-No pain  Height and Weight  Height: 5' 3" (160 cm)  Weight: 73.1 kg (161 lb 4.3 oz)  BSA (Calculated - sq m): 1.8 sq meters  BMI (Calculated): 28.6  Weight in (lb) to have BMI = 25: 140.8]    Physical Exam  Vitals reviewed.   Constitutional:       Appearance: She is well-developed and well-nourished.   HENT:      Head: Normocephalic and atraumatic.      Right Ear: External ear normal.      " Left Ear: External ear normal.      Mouth/Throat:      Mouth: Oropharynx is clear and moist.   Cardiovascular:      Rate and Rhythm: Normal rate and regular rhythm.      Heart sounds: Normal heart sounds. No murmur heard.      Pulmonary:      Effort: Pulmonary effort is normal.      Breath sounds: Normal breath sounds. No wheezing or rales.   Abdominal:      General: Bowel sounds are normal. There is no distension.      Palpations: Abdomen is soft.      Tenderness: There is no abdominal tenderness.          Laboratory:  CBC:  Recent Labs   Lab 02/07/20 0644 02/07/20  0644 02/09/21  0825 02/09/21  0825 02/01/22  0809   WBC 7.0   < > 8.62   < > 8.2   RBC 4.98   < > 4.63   < > 4.77   Hemoglobin 13.7   < > 12.7   < > 13.3   Hematocrit 41.7   < > 40.0   < > 40.7   Platelets 254   < > 243   < > 224   MCV 83.7   < > 86   < > 85.3   MCH 27.5   < > 27.4   < > 27.9   MCHC 32.9  --  31.8 L  --  32.7    < > = values in this interval not displayed.     CMP:  Recent Labs   Lab 02/07/20 0644 02/07/20  0644 02/09/21  0825 02/09/21  0825 02/01/22  0809   Glucose 91   < > 103   < > 84   Calcium 9.6   < > 9.7   < > 9.2   Albumin 4.0   < > 4.1   < > 4.0   Total Protein 7.0   < > 7.7   < > 6.8   Sodium 141   < > 142   < > 141   Potassium 4.8   < > 3.8   < > 4.3   CO2 31   < > 32 H   < > 28   Chloride 102   < > 103   < > 104   BUN 16   < > 16   < > 15   Alkaline Phosphatase 83   < > 97  --   --    ALT 17   < > 20   < > 15   AST 18   < > 72 H   < > 18   Total Bilirubin 0.7  --  0.5  --  0.8    < > = values in this interval not displayed.     URINALYSIS:       LIPIDS:  Recent Labs   Lab 02/07/20 0644 02/09/21  0825 02/09/21  0826 02/01/22  0809   TSH 2.07  --  1.840  --    HDL 62 52  --  54   Cholesterol 195 175  --  169   Triglycerides 82 54  --  73   LDL Cholesterol 115 H 112.2  --  99   HDL/Cholesterol Ratio 3.1 29.7  --  3.1   Non-HDL Cholesterol  --  123  --   --    Non HDL Chol. (LDL+VLDL) 133 H  --   --  115   Total  Cholesterol/HDL Ratio  --  3.4  --   --      TSH:  Recent Labs   Lab 02/07/20  0644 02/09/21  0826   TSH 2.07 1.840     A1C:        Assessment/Plan     Kierra Acosta is a 66 y.o.female with:    1. Annual physical exam  Discussed diet and exercise, vaccines and cancer screening, risk factors.  Screening labs reviewed  2. Essential hypertension  Continue current meds.    3. Hyperlipidemia, mixed  Continue current meds.    4. Genital herpes simplex, unspecified site  - acyclovir (ZOVIRAX) 400 MG tablet; Take 1 tablet (400 mg total) by mouth once daily.  Dispense: 90 tablet; Refill: 3  Continue current meds.    5. Postmenopausal  - DXA Bone Density Spine And Hip; Future  - DXA Bone Density Spine And Hip       Chronic conditions status updated as per HPI.  Other than changes above, cont current medications and maintain follow up with specialists.  Follow up in about 6 months (around 8/8/2022) for Follow up visit.    No future appointments.    Chon Sauer MD  [facility]

## 2022-03-03 ENCOUNTER — PATIENT OUTREACH (OUTPATIENT)
Dept: ADMINISTRATIVE | Facility: HOSPITAL | Age: 67
End: 2022-03-03
Payer: COMMERCIAL

## 2022-03-03 NOTE — PROGRESS NOTES
Health Maintenance Due   Topic Date Due    TETANUS VACCINE  Never done    Shingles Vaccine (1 of 2) Never done    Influenza Vaccine (1) Never done    Pneumococcal Vaccines (Age 65+) (1 of 1 - PPSV23) Never done     Triggered LINKS.  Updated Care Everywhere.  Scanned & hyperlinked outside DEXA scan results into .  Chart review completed.

## 2022-03-09 ENCOUNTER — OFFICE VISIT (OUTPATIENT)
Dept: INTERNAL MEDICINE | Facility: CLINIC | Age: 67
End: 2022-03-09
Payer: COMMERCIAL

## 2022-03-09 VITALS
OXYGEN SATURATION: 98 % | SYSTOLIC BLOOD PRESSURE: 134 MMHG | DIASTOLIC BLOOD PRESSURE: 90 MMHG | HEART RATE: 82 BPM | HEIGHT: 63 IN | WEIGHT: 161.5 LBS | BODY MASS INDEX: 28.62 KG/M2

## 2022-03-09 DIAGNOSIS — J06.9 URTI (ACUTE UPPER RESPIRATORY INFECTION): Primary | ICD-10-CM

## 2022-03-09 PROCEDURE — 96372 PR INJECTION,THERAP/PROPH/DIAG2ST, IM OR SUBCUT: ICD-10-PCS | Mod: S$GLB,,, | Performed by: INTERNAL MEDICINE

## 2022-03-09 PROCEDURE — 99214 OFFICE O/P EST MOD 30 MIN: CPT | Mod: 25,S$GLB,, | Performed by: INTERNAL MEDICINE

## 2022-03-09 PROCEDURE — 96372 THER/PROPH/DIAG INJ SC/IM: CPT | Mod: S$GLB,,, | Performed by: INTERNAL MEDICINE

## 2022-03-09 PROCEDURE — 99999 PR PBB SHADOW E&M-EST. PATIENT-LVL IV: ICD-10-PCS | Mod: PBBFAC,,, | Performed by: INTERNAL MEDICINE

## 2022-03-09 PROCEDURE — 99214 PR OFFICE/OUTPT VISIT, EST, LEVL IV, 30-39 MIN: ICD-10-PCS | Mod: 25,S$GLB,, | Performed by: INTERNAL MEDICINE

## 2022-03-09 PROCEDURE — 99999 PR PBB SHADOW E&M-EST. PATIENT-LVL IV: CPT | Mod: PBBFAC,,, | Performed by: INTERNAL MEDICINE

## 2022-03-09 RX ORDER — TRIAMCINOLONE ACETONIDE 40 MG/ML
40 INJECTION, SUSPENSION INTRA-ARTICULAR; INTRAMUSCULAR ONCE
Status: COMPLETED | OUTPATIENT
Start: 2022-03-09 | End: 2022-03-09

## 2022-03-09 RX ORDER — CODEINE PHOSPHATE AND GUAIFENESIN 10; 100 MG/5ML; MG/5ML
5 SOLUTION ORAL 3 TIMES DAILY PRN
Qty: 75 ML | Refills: 0 | Status: SHIPPED | OUTPATIENT
Start: 2022-03-09 | End: 2022-03-19

## 2022-03-09 RX ORDER — AZITHROMYCIN 250 MG/1
TABLET, FILM COATED ORAL
Qty: 6 TABLET | Refills: 0 | Status: SHIPPED | OUTPATIENT
Start: 2022-03-09 | End: 2022-03-14

## 2022-03-09 RX ADMIN — TRIAMCINOLONE ACETONIDE 40 MG: 40 INJECTION, SUSPENSION INTRA-ARTICULAR; INTRAMUSCULAR at 02:03

## 2022-03-09 NOTE — PROGRESS NOTES
[facility] Note    Chief Complaint      Chief Complaint   Patient presents with    Cough     And nasal drip       History of Present Illness      Kierra Acosta is a 66 y.o. female with chronic conditions of  HTN, HLD, Genital herpes who presents today for: cough, postnasal drip, sinus pressure.  Symptoms present for the past week.  Tried OTC without resolution.  Denies fevers, chills.  Took a home COVID test which was negative.    Past Medical History:  Past Medical History:   Diagnosis Date    Hives        Past Surgical History:   has a past surgical history that includes Colonoscopy (03/04/2020).    Family History:  family history includes Hyperlipidemia in her mother; Hypertension in her mother; No Known Problems in her father.     Social History:  Social History     Tobacco Use    Smoking status: Never Smoker    Smokeless tobacco: Never Used   Substance Use Topics    Alcohol use: Yes     Alcohol/week: 0.0 standard drinks     Comment: May have 1-2. Do not drink every week. Beer/wine    Drug use: Never       I personally reviewed all past medical, surgical, social and family history.    Review of Systems   Constitutional: Negative for chills, fever, malaise/fatigue and weight loss.   HENT: Negative for ear pain and sore throat.    Respiratory: Negative for hemoptysis, shortness of breath and wheezing.    Cardiovascular: Negative for chest pain.   Gastrointestinal: Negative for constipation, diarrhea, heartburn, nausea and vomiting.   Musculoskeletal: Negative for myalgias.   Skin: Negative for rash.   Neurological: Negative for weakness and headaches.   Endo/Heme/Allergies: Negative for environmental allergies.   All other systems reviewed and are negative.       Medications:  Outpatient Encounter Medications as of 3/9/2022   Medication Sig Dispense Refill    acyclovir (ZOVIRAX) 400 MG tablet Take 1 tablet (400 mg total) by mouth once daily. 90 tablet 3    amLODIPine (NORVASC) 5 MG tablet  "TAKE 1 TABLET(5 MG) BY MOUTH EVERY DAY 90 tablet 3    atorvastatin (LIPITOR) 10 MG tablet Take 1 tablet (10 mg total) by mouth once daily. 90 tablet 3    azithromycin (Z-SIRISHA) 250 MG tablet Take 2 tablets by mouth on day 1; Take 1 tablet by mouth on days 2-5 6 tablet 0    guaiFENesin-codeine 100-10 mg/5 ml (TUSSI-ORGANIDIN NR)  mg/5 mL syrup Take 5 mLs by mouth 3 (three) times daily as needed for Cough. 75 mL 0    levocetirizine (XYZAL) 5 MG tablet Take 5 mg by mouth nightly as needed.       triamcinolone acetonide 0.1% (KENALOG) 0.1 % cream MANAS EXT AA BID FOR UP TO TWO WEEKS TO RASH ON BODY       Facility-Administered Encounter Medications as of 3/9/2022   Medication Dose Route Frequency Provider Last Rate Last Admin    [COMPLETED] triamcinolone acetonide injection 40 mg  40 mg Intramuscular Once Chon Sauer MD   40 mg at 03/09/22 1438       Allergies:  Review of patient's allergies indicates:   Allergen Reactions    Sulfamethoxazole-trimethoprim      Other reaction(s): Cutaneous eruption (morphologic abnormality)       Health Maintenance:  Immunization History   Administered Date(s) Administered    COVID-19, MRNA, LN-S, PF (Pfizer) (Purple Cap) 02/10/2021, 03/03/2021, 09/23/2021      Health Maintenance   Topic Date Due    TETANUS VACCINE  Never done    Mammogram  08/27/2022    DEXA Scan  08/14/2023    Lipid Panel  02/01/2027    Hepatitis C Screening  Completed        Physical Exam      Vital Signs  Pulse: 82  SpO2: 98 %  BP: (!) 134/90  BP Location: Left arm  Patient Position: Sitting  Pain Score: 0-No pain  Height and Weight  Height: 5' 3" (160 cm)  Weight: 73.2 kg (161 lb 7.8 oz)  BSA (Calculated - sq m): 1.8 sq meters  BMI (Calculated): 28.6  Weight in (lb) to have BMI = 25: 140.8]    Physical Exam  Vitals reviewed.   Constitutional:       Appearance: She is well-developed.   HENT:      Head: Normocephalic and atraumatic.      Right Ear: External ear normal.      Left Ear: External ear " normal.   Cardiovascular:      Rate and Rhythm: Normal rate and regular rhythm.      Heart sounds: Normal heart sounds. No murmur heard.  Pulmonary:      Effort: Pulmonary effort is normal.      Breath sounds: Normal breath sounds. No wheezing or rales.   Abdominal:      General: Bowel sounds are normal. There is no distension.      Palpations: Abdomen is soft.      Tenderness: There is no abdominal tenderness.          Laboratory:  CBC:  Recent Labs   Lab 02/07/20  0644 02/09/21  0825 02/01/22  0809   WBC 7.0 8.62 8.2   RBC 4.98 4.63 4.77   Hemoglobin 13.7 12.7 13.3   Hematocrit 41.7 40.0 40.7   Platelets 254 243 224   MCV 83.7 86 85.3   MCH 27.5 27.4 27.9   MCHC 32.9 31.8 L 32.7     CMP:  Recent Labs   Lab 02/07/20 0644 02/09/21  0825 02/01/22  0809   Glucose 91 103 84   Calcium 9.6 9.7 9.2   Albumin 4.0 4.1 4.0   Total Protein 7.0 7.7 6.8   Sodium 141 142 141   Potassium 4.8 3.8 4.3   CO2 31 32 H 28   Chloride 102 103 104   BUN 16 16 15   Alkaline Phosphatase 83 97  --    ALT 17 20 15   AST 18 72 H 18   Total Bilirubin 0.7 0.5 0.8     URINALYSIS:       LIPIDS:  Recent Labs   Lab 02/07/20 0644 02/09/21  0825 02/09/21  0826 02/01/22  0809   TSH 2.07  --  1.840  --    HDL 62 52  --  54   Cholesterol 195 175  --  169   Triglycerides 82 54  --  73   LDL Cholesterol 115 H 112.2  --  99   HDL/Cholesterol Ratio 3.1 29.7  --  3.1   Non-HDL Cholesterol  --  123  --   --    Non HDL Chol. (LDL+VLDL) 133 H  --   --  115   Total Cholesterol/HDL Ratio  --  3.4  --   --      TSH:  Recent Labs   Lab 02/07/20 0644 02/09/21  0826   TSH 2.07 1.840     A1C:        Assessment/Plan     Kierra Acosta is a 66 y.o.female with:    1. URTI (acute upper respiratory infection)  - azithromycin (Z-SIRISHA) 250 MG tablet; Take 2 tablets by mouth on day 1; Take 1 tablet by mouth on days 2-5  Dispense: 6 tablet; Refill: 0  - triamcinolone acetonide injection 40 mg  - guaiFENesin-codeine 100-10 mg/5 ml (TUSSI-ORGANIDIN NR)  mg/5 mL syrup; Take  5 mLs by mouth 3 (three) times daily as needed for Cough.  Dispense: 75 mL; Refill: 0  Steroid shot, zpak, cough syrup.  Cont OTC.  Call if not resolving.     Chronic conditions status updated as per HPI.  Other than changes above, cont current medications and maintain follow up with specialists.  No follow-ups on file.    Future Appointments   Date Time Provider Department Center   8/9/2022  7:30 AM Chon Sauer MD [facility]       Chon Sauer MD  [facility]                  Answers for HPI/ROS submitted by the patient on 3/9/2022  Chronicity: new  Onset: 1 to 4 weeks ago  Progression since onset: gradually improving  Frequency: every few hours  Cough characteristics: non-productive  ear congestion: No  nasal congestion: No  postnasal drip: Yes  rhinorrhea: No  sweats: No  Aggravated by: nothing  asthma: No  bronchiectasis: No  bronchitis: No  COPD: No  emphysema: No  pneumonia: No  Treatments tried: OTC cough suppressant

## 2022-03-15 ENCOUNTER — PATIENT MESSAGE (OUTPATIENT)
Dept: INTERNAL MEDICINE | Facility: CLINIC | Age: 67
End: 2022-03-15
Payer: COMMERCIAL

## 2022-03-15 NOTE — TELEPHONE ENCOUNTER
May be a side effect of recent steroid injection in office 3/9/2022.  Please have pt come back in 1 week for in office nurse visit bp check

## 2022-03-16 ENCOUNTER — PATIENT MESSAGE (OUTPATIENT)
Dept: INTERNAL MEDICINE | Facility: CLINIC | Age: 67
End: 2022-03-16
Payer: COMMERCIAL

## 2022-03-23 ENCOUNTER — CLINICAL SUPPORT (OUTPATIENT)
Dept: INTERNAL MEDICINE | Facility: CLINIC | Age: 67
End: 2022-03-23
Payer: COMMERCIAL

## 2022-03-23 DIAGNOSIS — I10 ESSENTIAL HYPERTENSION: Primary | ICD-10-CM

## 2022-03-23 PROCEDURE — 99999 PR PBB SHADOW E&M-EST. PATIENT-LVL I: CPT | Mod: PBBFAC,,,

## 2022-03-23 PROCEDURE — 99999 PR PBB SHADOW E&M-EST. PATIENT-LVL I: ICD-10-PCS | Mod: PBBFAC,,,

## 2022-04-14 DIAGNOSIS — E78.2 HYPERLIPIDEMIA, MIXED: ICD-10-CM

## 2022-04-14 RX ORDER — ATORVASTATIN CALCIUM 10 MG/1
TABLET, FILM COATED ORAL
Qty: 90 TABLET | Refills: 3 | Status: SHIPPED | OUTPATIENT
Start: 2022-04-14 | End: 2023-04-06 | Stop reason: SDUPTHER

## 2022-04-14 NOTE — TELEPHONE ENCOUNTER
Refill Authorization Note   Kierra BonnerDave  is requesting a refill authorization.  Brief Assessment and Rationale for Refill:  Approve     Medication Therapy Plan:       Medication Reconciliation Completed: No   Comments:     No Care Gaps recommended.     Note composed:10:00 AM 04/14/2022

## 2022-04-14 NOTE — TELEPHONE ENCOUNTER
No new care gaps identified.  Powered by [employer]. Reference number: [id number].   4/14/2022 3:52:34 AM CDT

## 2022-05-31 ENCOUNTER — TELEPHONE (OUTPATIENT)
Dept: INTERNAL MEDICINE | Facility: CLINIC | Age: 67
End: 2022-05-31
Payer: COMMERCIAL

## 2022-06-01 ENCOUNTER — PATIENT MESSAGE (OUTPATIENT)
Dept: ADMINISTRATIVE | Facility: OTHER | Age: 67
End: 2022-06-01
Payer: COMMERCIAL

## 2022-06-02 ENCOUNTER — PATIENT MESSAGE (OUTPATIENT)
Dept: ADMINISTRATIVE | Facility: OTHER | Age: 67
End: 2022-06-02
Payer: COMMERCIAL

## 2022-06-02 VITALS — DIASTOLIC BLOOD PRESSURE: 64 MMHG | SYSTOLIC BLOOD PRESSURE: 150 MMHG

## 2022-06-03 ENCOUNTER — CLINICAL SUPPORT (OUTPATIENT)
Dept: INTERNAL MEDICINE | Facility: CLINIC | Age: 67
End: 2022-06-03
Payer: COMMERCIAL

## 2022-06-03 VITALS — HEART RATE: 93 BPM | SYSTOLIC BLOOD PRESSURE: 136 MMHG | DIASTOLIC BLOOD PRESSURE: 84 MMHG

## 2022-06-03 PROCEDURE — 99999 PR PBB SHADOW E&M-EST. PATIENT-LVL II: CPT | Mod: PBBFAC,,,

## 2022-06-03 PROCEDURE — 99999 PR PBB SHADOW E&M-EST. PATIENT-LVL II: ICD-10-PCS | Mod: PBBFAC,,,

## 2022-08-09 ENCOUNTER — OFFICE VISIT (OUTPATIENT)
Dept: INTERNAL MEDICINE | Facility: CLINIC | Age: 67
End: 2022-08-09
Payer: COMMERCIAL

## 2022-08-09 VITALS
DIASTOLIC BLOOD PRESSURE: 100 MMHG | HEIGHT: 63 IN | WEIGHT: 156.75 LBS | SYSTOLIC BLOOD PRESSURE: 174 MMHG | HEART RATE: 78 BPM | OXYGEN SATURATION: 99 % | BODY MASS INDEX: 27.77 KG/M2

## 2022-08-09 DIAGNOSIS — J06.9 URTI (ACUTE UPPER RESPIRATORY INFECTION): ICD-10-CM

## 2022-08-09 DIAGNOSIS — E78.2 HYPERLIPIDEMIA, MIXED: ICD-10-CM

## 2022-08-09 DIAGNOSIS — A60.00 GENITAL HERPES SIMPLEX, UNSPECIFIED SITE: ICD-10-CM

## 2022-08-09 DIAGNOSIS — F41.9 ANXIETY: ICD-10-CM

## 2022-08-09 DIAGNOSIS — Z00.00 ANNUAL PHYSICAL EXAM: ICD-10-CM

## 2022-08-09 DIAGNOSIS — I10 ESSENTIAL HYPERTENSION: Primary | ICD-10-CM

## 2022-08-09 PROCEDURE — 99999 PR PBB SHADOW E&M-EST. PATIENT-LVL III: ICD-10-PCS | Mod: PBBFAC,,, | Performed by: INTERNAL MEDICINE

## 2022-08-09 PROCEDURE — 99214 OFFICE O/P EST MOD 30 MIN: CPT | Mod: 25,S$GLB,, | Performed by: INTERNAL MEDICINE

## 2022-08-09 PROCEDURE — 99999 PR PBB SHADOW E&M-EST. PATIENT-LVL III: CPT | Mod: PBBFAC,,, | Performed by: INTERNAL MEDICINE

## 2022-08-09 PROCEDURE — 96372 THER/PROPH/DIAG INJ SC/IM: CPT | Mod: S$GLB,,, | Performed by: INTERNAL MEDICINE

## 2022-08-09 PROCEDURE — 96372 PR INJECTION,THERAP/PROPH/DIAG2ST, IM OR SUBCUT: ICD-10-PCS | Mod: S$GLB,,, | Performed by: INTERNAL MEDICINE

## 2022-08-09 PROCEDURE — 99214 PR OFFICE/OUTPT VISIT, EST, LEVL IV, 30-39 MIN: ICD-10-PCS | Mod: 25,S$GLB,, | Performed by: INTERNAL MEDICINE

## 2022-08-09 RX ORDER — FLUTICASONE PROPIONATE 50 MCG
1 SPRAY, SUSPENSION (ML) NASAL DAILY
Qty: 16 G | Refills: 1 | Status: SHIPPED | OUTPATIENT
Start: 2022-08-09 | End: 2023-02-07

## 2022-08-09 RX ORDER — AZITHROMYCIN 250 MG/1
TABLET, FILM COATED ORAL
Qty: 6 TABLET | Refills: 0 | Status: SHIPPED | OUTPATIENT
Start: 2022-08-09 | End: 2022-08-14

## 2022-08-09 RX ORDER — ALPRAZOLAM 0.5 MG/1
0.5 TABLET ORAL 2 TIMES DAILY PRN
Qty: 15 TABLET | Refills: 0 | Status: SHIPPED | OUTPATIENT
Start: 2022-08-09 | End: 2023-02-07

## 2022-08-09 RX ORDER — TRIAMCINOLONE ACETONIDE 40 MG/ML
40 INJECTION, SUSPENSION INTRA-ARTICULAR; INTRAMUSCULAR ONCE
Status: COMPLETED | OUTPATIENT
Start: 2022-08-09 | End: 2022-08-09

## 2022-08-09 RX ADMIN — TRIAMCINOLONE ACETONIDE 40 MG: 40 INJECTION, SUSPENSION INTRA-ARTICULAR; INTRAMUSCULAR at 08:08

## 2022-08-09 NOTE — PROGRESS NOTES
[facility] Note    Chief Complaint      Chief Complaint   Patient presents with    Hypertension     6 month f/u    Wheezing     At night    Sinus Problem     Nasal drip       History of Present Illness      Kierra Acosta is a 66 y.o. female with chronic conditions of HTN, HLD, Genital herpes who presents today for: follow up chronic conditions. Complains of significant anxiety, her son was murdered a few months ago and she has a court case tomorrow to go to.  Complains of post nasal drip, cough and wheezing (mostly at night).  Present for the past few months.    HTN: BP at goal on amlodipine previously, due to above. Will recheck in 2 weeks  HLD: Controlled on atorvastatin.  LDL 99, last check.    Genital herpes: Uses acyclovir and hydrocortisone cream as needed.     FLu shot declines.  Td declines.  Shingles vaccine declines.  Pneumonia vaccine due age 66.  COVID vaccine UTD w/booster.  Mammogram 8/2021 with Dr. Felder, shceduled on 8/29/2022.  PAP smear UTD. DEXA UTD 2020  Cscope UTD 2020 with Dr. Azevedo, no polyps, 10 yr interval.     Past Medical History:  Past Medical History:   Diagnosis Date    Hives        Past Surgical History:   has a past surgical history that includes Colonoscopy (03/04/2020).    Family History:  family history includes Hyperlipidemia in her mother; Hypertension in her mother; No Known Problems in her father.     Social History:  Social History     Tobacco Use    Smoking status: Never Smoker    Smokeless tobacco: Never Used   Substance Use Topics    Alcohol use: Yes     Comment: May have 1-2. Do not drink every week. Beer/wine    Drug use: Never       I personally reviewed all past medical, surgical, social and family history.    Review of Systems   Constitutional: Negative for chills, fever and malaise/fatigue.   Respiratory: Negative for shortness of breath.    Cardiovascular: Negative for chest pain.   Gastrointestinal: Negative for constipation,  diarrhea, nausea and vomiting.   Skin: Negative for rash.   Neurological: Negative for weakness.   Psychiatric/Behavioral: The patient is nervous/anxious.    All other systems reviewed and are negative.       Medications:  Outpatient Encounter Medications as of 8/9/2022   Medication Sig Dispense Refill    acyclovir (ZOVIRAX) 400 MG tablet Take 1 tablet (400 mg total) by mouth once daily. 90 tablet 3    ALPRAZolam (XANAX) 0.5 MG tablet Take 1 tablet (0.5 mg total) by mouth 2 (two) times daily as needed for Anxiety. 15 tablet 0    amLODIPine (NORVASC) 5 MG tablet TAKE 1 TABLET(5 MG) BY MOUTH EVERY DAY 90 tablet 3    atorvastatin (LIPITOR) 10 MG tablet TAKE 1 TABLET(10 MG) BY MOUTH EVERY DAY 90 tablet 3    azithromycin (Z-SIRISHA) 250 MG tablet Take 2 tablets by mouth on day 1; Take 1 tablet by mouth on days 2-5 6 tablet 0    fluticasone propionate (FLONASE) 50 mcg/actuation nasal spray 1 spray (50 mcg total) by Each Nostril route once daily. 16 g 1    levocetirizine (XYZAL) 5 MG tablet Take 5 mg by mouth nightly as needed.       triamcinolone acetonide 0.1% (KENALOG) 0.1 % cream MANAS EXT AA BID FOR UP TO TWO WEEKS TO RASH ON BODY       Facility-Administered Encounter Medications as of 8/9/2022   Medication Dose Route Frequency Provider Last Rate Last Admin    triamcinolone acetonide injection 40 mg  40 mg Intramuscular Once Chon Sauer MD           Allergies:  Review of patient's allergies indicates:   Allergen Reactions    Sulfamethoxazole-trimethoprim      Other reaction(s): Cutaneous eruption (morphologic abnormality)       Health Maintenance:  Immunization History   Administered Date(s) Administered    COVID-19, MRNA, LN-S, PF (Pfizer) (Purple Cap) 02/10/2021, 03/03/2021, 09/23/2021, 06/11/2022      Health Maintenance   Topic Date Due    TETANUS VACCINE  Never done    Mammogram  08/27/2022    DEXA Scan  08/14/2023    Lipid Panel  02/01/2027    Hepatitis C Screening  Completed        Physical Exam  "     Vital Signs  Pulse: 78  SpO2: 99 %  BP: (!) 174/100  BP Location: Left arm  Patient Position: Sitting  Pain Score: 0-No pain  Height and Weight  Height: 5' 3" (160 cm)  Weight: 71.1 kg (156 lb 12 oz)  BSA (Calculated - sq m): 1.78 sq meters  BMI (Calculated): 27.8  Weight in (lb) to have BMI = 25: 140.8]    Physical Exam  Vitals reviewed.   Constitutional:       Appearance: She is well-developed.   HENT:      Head: Normocephalic and atraumatic.      Right Ear: External ear normal.      Left Ear: External ear normal.   Cardiovascular:      Rate and Rhythm: Normal rate and regular rhythm.      Heart sounds: Normal heart sounds. No murmur heard.  Pulmonary:      Effort: Pulmonary effort is normal.      Breath sounds: Wheezing present. No rales.   Abdominal:      General: Bowel sounds are normal. There is no distension.      Palpations: Abdomen is soft.      Tenderness: There is no abdominal tenderness.          Laboratory:  CBC:  Recent Labs   Lab 02/07/20 0644 02/09/21  0825 02/01/22  0809   WBC 7.0 8.62 8.2   RBC 4.98 4.63 4.77   Hemoglobin 13.7 12.7 13.3   Hematocrit 41.7 40.0 40.7   Platelets 254 243 224   MCV 83.7 86 85.3   MCH 27.5 27.4 27.9   MCHC 32.9 31.8 L 32.7     CMP:  Recent Labs   Lab 02/07/20  0644 02/09/21  0825 02/01/22  0809   Glucose 91 103 84   Calcium 9.6 9.7 9.2   Albumin 4.0 4.1 4.0   Total Protein 7.0 7.7 6.8   Sodium 141 142 141   Potassium 4.8 3.8 4.3   CO2 31 32 H 28   Chloride 102 103 104   BUN 16 16 15   Alkaline Phosphatase 83 97  --    ALT 17 20 15   AST 18 72 H 18   Total Bilirubin 0.7 0.5 0.8     URINALYSIS:       LIPIDS:  Recent Labs   Lab 02/07/20  0644 02/09/21  0825 02/09/21  0826 02/01/22  0809   TSH 2.07  --  1.840  --    HDL 62 52  --  54   Cholesterol 195 175  --  169   Triglycerides 82 54  --  73   LDL Cholesterol 115 H 112.2  --  99   HDL/Cholesterol Ratio 3.1 29.7  --  3.1   Non-HDL Cholesterol  --  123  --   --    Non HDL Chol. (LDL+VLDL) 133 H  --   --  115   Total " Cholesterol/HDL Ratio  --  3.4  --   --      TSH:  Recent Labs   Lab 02/07/20  0644 02/09/21  0826   TSH 2.07 1.840     A1C:        Assessment/Plan     Kierra Acosta is a 66 y.o.female with:    1. Essential hypertension  Continue current meds.  BP check in 2 weeks.    2. Hyperlipidemia, mixed  Continue current meds.    3. Genital herpes simplex, unspecified site  Continue current meds.    4. Anxiety  - ALPRAZolam (XANAX) 0.5 MG tablet; Take 1 tablet (0.5 mg total) by mouth 2 (two) times daily as needed for Anxiety.  Dispense: 15 tablet; Refill: 0  Giving short course of xanax to help with court cases and anxiety attacks.  5. URTI (acute upper respiratory infection)  - azithromycin (Z-SIRISHA) 250 MG tablet; Take 2 tablets by mouth on day 1; Take 1 tablet by mouth on days 2-5  Dispense: 6 tablet; Refill: 0  - triamcinolone acetonide injection 40 mg  - fluticasone propionate (FLONASE) 50 mcg/actuation nasal spray; 1 spray (50 mcg total) by Each Nostril route once daily.  Dispense: 16 g; Refill: 1  Start zpak and flonase.  Giving steroid shot today.  6. Annual physical exam  - Comprehensive Metabolic Panel; Future  - Lipid Panel; Future  - CBC Auto Differential; Future  - TSH; Future  - T4, Free; Future       Chronic conditions status updated as per HPI.  Other than changes above, cont current medications and maintain follow up with specialists.  Follow up in about 2 weeks (around 8/23/2022) for Nurse visit BP check.    No future appointments.    Chon Sauer MD  [facility]

## 2022-09-12 ENCOUNTER — TELEPHONE (OUTPATIENT)
Dept: INTERNAL MEDICINE | Facility: CLINIC | Age: 67
End: 2022-09-12
Payer: COMMERCIAL

## 2022-09-12 ENCOUNTER — PATIENT MESSAGE (OUTPATIENT)
Dept: INTERNAL MEDICINE | Facility: CLINIC | Age: 67
End: 2022-09-12
Payer: COMMERCIAL

## 2022-09-12 NOTE — TELEPHONE ENCOUNTER
----- Message from Lawanda Yi sent at 9/12/2022  7:08 AM CDT -----  Contact: pt [phone]  Patient states that she went to the ED on 09/06/2022 and would like you to review her test, EKG etc. Also stated that her BP has been elevated. Today /90    Please call and advise.    Thank You

## 2022-09-13 ENCOUNTER — OFFICE VISIT (OUTPATIENT)
Dept: INTERNAL MEDICINE | Facility: CLINIC | Age: 67
End: 2022-09-13
Payer: COMMERCIAL

## 2022-09-13 VITALS
SYSTOLIC BLOOD PRESSURE: 136 MMHG | HEIGHT: 63 IN | WEIGHT: 155.63 LBS | OXYGEN SATURATION: 98 % | HEART RATE: 80 BPM | BODY MASS INDEX: 27.57 KG/M2 | DIASTOLIC BLOOD PRESSURE: 90 MMHG

## 2022-09-13 DIAGNOSIS — M25.511 ACUTE PAIN OF RIGHT SHOULDER: Primary | ICD-10-CM

## 2022-09-13 DIAGNOSIS — I10 ESSENTIAL HYPERTENSION: ICD-10-CM

## 2022-09-13 DIAGNOSIS — M54.2 NECK PAIN ON RIGHT SIDE: ICD-10-CM

## 2022-09-13 PROCEDURE — 99214 PR OFFICE/OUTPT VISIT, EST, LEVL IV, 30-39 MIN: ICD-10-PCS | Mod: S$GLB,,, | Performed by: INTERNAL MEDICINE

## 2022-09-13 PROCEDURE — 99999 PR PBB SHADOW E&M-EST. PATIENT-LVL IV: CPT | Mod: PBBFAC,,, | Performed by: INTERNAL MEDICINE

## 2022-09-13 PROCEDURE — 99999 PR PBB SHADOW E&M-EST. PATIENT-LVL IV: ICD-10-PCS | Mod: PBBFAC,,, | Performed by: INTERNAL MEDICINE

## 2022-09-13 PROCEDURE — 99214 OFFICE O/P EST MOD 30 MIN: CPT | Mod: S$GLB,,, | Performed by: INTERNAL MEDICINE

## 2022-09-13 RX ORDER — TRAMADOL HYDROCHLORIDE 50 MG/1
50 TABLET ORAL EVERY 6 HOURS PRN
Qty: 28 TABLET | Refills: 0 | Status: SHIPPED | OUTPATIENT
Start: 2022-09-13

## 2022-09-13 RX ORDER — OLMESARTAN MEDOXOMIL AND HYDROCHLOROTHIAZIDE 20/12.5 20; 12.5 MG/1; MG/1
1 TABLET ORAL DAILY
Qty: 90 TABLET | Refills: 3 | Status: SHIPPED | OUTPATIENT
Start: 2022-09-13 | End: 2023-11-13 | Stop reason: SDUPTHER

## 2022-09-13 NOTE — PROGRESS NOTES
[facility] Note    Chief Complaint      Chief Complaint   Patient presents with    Follow-up     ER visit for arm pain        History of Present Illness      Kierra Acosta is a 66 y.o. female with chronic conditions of HTN, HLD, Genital herpes who presents today for: ER follow up for right arm pain.  Symptoms started 8/30 next morning after washing her dogs.  Pain worsened by lying on right side and reaching in certain directions with right arm.  Pain located posterior right shoulder, then started to radiate to right posterior neck and down right arm.  Urgent care on 8/31 and given two shots and pills which were not effective.  Then pt went to ER on 9/2 and given more shots and pills which did help.     FLu shot declines.  Td declines.  Shingles vaccine declines.  Pneumonia vaccine due age 66.  COVID vaccine UTD w/booster.  Mammogram 8/2021 with Dr. Felder, shceduled on 8/29/2022.  PAP smear UTD. DEXA UTD 2020  Cscope UTD 2020 with Dr. Azevedo, no polyps, 10 yr interval.    Past Medical History:  Past Medical History:   Diagnosis Date    Hives        Past Surgical History:   has a past surgical history that includes Colonoscopy (03/04/2020).    Family History:  family history includes Hyperlipidemia in her mother; Hypertension in her mother; No Known Problems in her father.     Social History:  Social History     Tobacco Use    Smoking status: Never    Smokeless tobacco: Never   Substance Use Topics    Alcohol use: Yes     Comment: May have 1-2. Do not drink every week. Beer/wine    Drug use: Never       I personally reviewed all past medical, surgical, social and family history.    Review of Systems   Constitutional:  Negative for chills, fever and malaise/fatigue.   Respiratory:  Negative for shortness of breath.    Cardiovascular:  Negative for chest pain.   Gastrointestinal:  Negative for constipation, diarrhea, nausea and vomiting.   Skin:  Negative for rash.   Neurological:  Negative  "for weakness.   All other systems reviewed and are negative.     Medications:  Outpatient Encounter Medications as of 9/13/2022   Medication Sig Dispense Refill    acyclovir (ZOVIRAX) 400 MG tablet Take 1 tablet (400 mg total) by mouth once daily. 90 tablet 3    ALPRAZolam (XANAX) 0.5 MG tablet Take 1 tablet (0.5 mg total) by mouth 2 (two) times daily as needed for Anxiety. 15 tablet 0    amLODIPine (NORVASC) 5 MG tablet TAKE 1 TABLET(5 MG) BY MOUTH EVERY DAY 90 tablet 3    atorvastatin (LIPITOR) 10 MG tablet TAKE 1 TABLET(10 MG) BY MOUTH EVERY DAY 90 tablet 3    fluticasone propionate (FLONASE) 50 mcg/actuation nasal spray 1 spray (50 mcg total) by Each Nostril route once daily. 16 g 1    levocetirizine (XYZAL) 5 MG tablet Take 5 mg by mouth nightly as needed.       olmesartan-hydrochlorothiazide (BENICAR HCT) 20-12.5 mg per tablet Take 1 tablet by mouth once daily. 90 tablet 3    traMADoL (ULTRAM) 50 mg tablet Take 1 tablet (50 mg total) by mouth every 6 (six) hours as needed for Pain. 28 tablet 0    triamcinolone acetonide 0.1% (KENALOG) 0.1 % cream MANAS EXT AA BID FOR UP TO TWO WEEKS TO RASH ON BODY       No facility-administered encounter medications on file as of 9/13/2022.       Allergies:  Review of patient's allergies indicates:   Allergen Reactions    Sulfamethoxazole-trimethoprim      Other reaction(s): Cutaneous eruption (morphologic abnormality)       Health Maintenance:  Immunization History   Administered Date(s) Administered    COVID-19, MRNA, LN-S, PF (Pfizer) (Purple Cap) 02/10/2021, 03/03/2021, 09/23/2021, 06/11/2022      Health Maintenance   Topic Date Due    TETANUS VACCINE  Never done    Mammogram  08/29/2023    DEXA Scan  08/29/2025    Lipid Panel  02/01/2027    Hepatitis C Screening  Completed        Physical Exam      Vital Signs  Pulse: 80  SpO2: 98 %  BP: (!) 136/90  BP Location: Left arm  Patient Position: Sitting  Pain Score:   8  Pain Loc: Arm  Height and Weight  Height: 5' 3" (160 " cm)  Weight: 70.6 kg (155 lb 10.3 oz)  BSA (Calculated - sq m): 1.77 sq meters  BMI (Calculated): 27.6  Weight in (lb) to have BMI = 25: 140.8]    Physical Exam  Vitals reviewed.   Constitutional:       Appearance: She is well-developed.   HENT:      Head: Normocephalic and atraumatic.      Right Ear: External ear normal.      Left Ear: External ear normal.   Cardiovascular:      Rate and Rhythm: Normal rate and regular rhythm.      Heart sounds: Normal heart sounds. No murmur heard.  Pulmonary:      Effort: Pulmonary effort is normal.      Breath sounds: Normal breath sounds. No wheezing or rales.   Abdominal:      General: Bowel sounds are normal. There is no distension.      Palpations: Abdomen is soft.      Tenderness: There is no abdominal tenderness.   Musculoskeletal:         General: Tenderness (Right trapezius) present.        Laboratory:  CBC:  Recent Labs   Lab 02/07/20  0644 02/09/21  0825 02/01/22  0809   WBC 7.0 8.62 8.2   RBC 4.98 4.63 4.77   Hemoglobin 13.7 12.7 13.3   Hematocrit 41.7 40.0 40.7   Platelets 254 243 224   MCV 83.7 86 85.3   MCH 27.5 27.4 27.9   MCHC 32.9 31.8 L 32.7     CMP:  Recent Labs   Lab 02/07/20  0644 02/09/21  0825 02/01/22  0809   Glucose 91 103 84   Calcium 9.6 9.7 9.2   Albumin 4.0 4.1 4.0   Total Protein 7.0 7.7 6.8   Sodium 141 142 141   Potassium 4.8 3.8 4.3   CO2 31 32 H 28   Chloride 102 103 104   BUN 16 16 15   Alkaline Phosphatase 83 97  --    ALT 17 20 15   AST 18 72 H 18   Total Bilirubin 0.7 0.5 0.8     URINALYSIS:       LIPIDS:  Recent Labs   Lab 02/07/20  0644 02/09/21  0825 02/09/21  0826 02/01/22  0809   TSH 2.07  --  1.840  --    HDL 62 52  --  54   Cholesterol 195 175  --  169   Triglycerides 82 54  --  73   LDL Cholesterol 115 H 112.2  --  99   HDL/Cholesterol Ratio 3.1 29.7  --  3.1   Non-HDL Cholesterol  --  123  --   --    Non HDL Chol. (LDL+VLDL) 133 H  --   --  115   Total Cholesterol/HDL Ratio  --  3.4  --   --      TSH:  Recent Labs   Lab  02/07/20  0644 02/09/21  0826   TSH 2.07 1.840     A1C:        Assessment/Plan     Kierra Acosta is a 66 y.o.female with:    1. Acute pain of right shoulder  - traMADoL (ULTRAM) 50 mg tablet; Take 1 tablet (50 mg total) by mouth every 6 (six) hours as needed for Pain.  Dispense: 28 tablet; Refill: 0  2. Neck pain on right side  - traMADoL (ULTRAM) 50 mg tablet; Take 1 tablet (50 mg total) by mouth every 6 (six) hours as needed for Pain.  Dispense: 28 tablet; Refill: 0  Refilling tramadol.  Symptoms seem to be improving.  If pain persists, will refer to ortho vs check cervical spine xray if radicular pains persist.  3. Essential hypertension  - olmesartan-hydrochlorothiazide (BENICAR HCT) 20-12.5 mg per tablet; Take 1 tablet by mouth once daily.  Dispense: 90 tablet; Refill: 3   Adding olmesartan-hctz to amlodipine.  Pt call with home BP readings in 1-2 weeks.    Chronic conditions status updated as per HPI.  Other than changes above, cont current medications and maintain follow up with specialists.  No follow-ups on file.    Future Appointments   Date Time Provider Department Center   2/7/2023  7:45 AM Chon Sauer MD [facility]       Chon Sauer MD  [facility]

## 2023-01-30 ENCOUNTER — PATIENT MESSAGE (OUTPATIENT)
Dept: PRIMARY CARE CLINIC | Facility: CLINIC | Age: 68
End: 2023-01-30
Payer: COMMERCIAL

## 2023-02-03 LAB
ALBUMIN SERPL-MCNC: 3.9 G/DL (ref 3.6–5.1)
ALBUMIN/GLOB SERPL: 1.3 (CALC) (ref 1–2.5)
ALP SERPL-CCNC: 70 U/L (ref 37–153)
ALT SERPL-CCNC: 13 U/L (ref 6–29)
AST SERPL-CCNC: 16 U/L (ref 10–35)
BASOPHILS # BLD AUTO: 60 CELLS/UL (ref 0–200)
BASOPHILS NFR BLD AUTO: 0.7 %
BILIRUB SERPL-MCNC: 0.6 MG/DL (ref 0.2–1.2)
BUN SERPL-MCNC: 20 MG/DL (ref 7–25)
BUN/CREAT SERPL: NORMAL (CALC) (ref 6–22)
CALCIUM SERPL-MCNC: 9.4 MG/DL (ref 8.6–10.4)
CHLORIDE SERPL-SCNC: 103 MMOL/L (ref 98–110)
CHOLEST SERPL-MCNC: 193 MG/DL
CHOLEST/HDLC SERPL: 3.6 (CALC)
CO2 SERPL-SCNC: 31 MMOL/L (ref 20–32)
CREAT SERPL-MCNC: 0.96 MG/DL (ref 0.5–1.05)
EGFR: 65 ML/MIN/1.73M2
EOSINOPHIL # BLD AUTO: 241 CELLS/UL (ref 15–500)
EOSINOPHIL NFR BLD AUTO: 2.8 %
ERYTHROCYTE [DISTWIDTH] IN BLOOD BY AUTOMATED COUNT: 13.6 % (ref 11–15)
GLOBULIN SER CALC-MCNC: 2.9 G/DL (CALC) (ref 1.9–3.7)
GLUCOSE SERPL-MCNC: 81 MG/DL (ref 65–99)
HCT VFR BLD AUTO: 41.6 % (ref 35–45)
HDLC SERPL-MCNC: 53 MG/DL
HGB BLD-MCNC: 13.8 G/DL (ref 11.7–15.5)
LDLC SERPL CALC-MCNC: 119 MG/DL (CALC)
LYMPHOCYTES # BLD AUTO: 2752 CELLS/UL (ref 850–3900)
LYMPHOCYTES NFR BLD AUTO: 32 %
MCH RBC QN AUTO: 28.5 PG (ref 27–33)
MCHC RBC AUTO-ENTMCNC: 33.2 G/DL (ref 32–36)
MCV RBC AUTO: 85.8 FL (ref 80–100)
MONOCYTES # BLD AUTO: 542 CELLS/UL (ref 200–950)
MONOCYTES NFR BLD AUTO: 6.3 %
NEUTROPHILS # BLD AUTO: 5005 CELLS/UL (ref 1500–7800)
NEUTROPHILS NFR BLD AUTO: 58.2 %
NONHDLC SERPL-MCNC: 140 MG/DL (CALC)
PLATELET # BLD AUTO: 248 THOUSAND/UL (ref 140–400)
PMV BLD REES-ECKER: 11.6 FL (ref 7.5–12.5)
POTASSIUM SERPL-SCNC: 3.9 MMOL/L (ref 3.5–5.3)
PROT SERPL-MCNC: 6.8 G/DL (ref 6.1–8.1)
RBC # BLD AUTO: 4.85 MILLION/UL (ref 3.8–5.1)
SODIUM SERPL-SCNC: 141 MMOL/L (ref 135–146)
T4 FREE SERPL-MCNC: 1.1 NG/DL (ref 0.8–1.8)
TRIGL SERPL-MCNC: 103 MG/DL
TSH SERPL-ACNC: 1.46 MIU/L (ref 0.4–4.5)
WBC # BLD AUTO: 8.6 THOUSAND/UL (ref 3.8–10.8)

## 2023-02-07 ENCOUNTER — OFFICE VISIT (OUTPATIENT)
Dept: PRIMARY CARE CLINIC | Facility: CLINIC | Age: 68
End: 2023-02-07
Payer: MEDICARE

## 2023-02-07 VITALS
SYSTOLIC BLOOD PRESSURE: 136 MMHG | WEIGHT: 156.94 LBS | HEART RATE: 74 BPM | HEIGHT: 63 IN | OXYGEN SATURATION: 98 % | BODY MASS INDEX: 27.81 KG/M2 | DIASTOLIC BLOOD PRESSURE: 84 MMHG

## 2023-02-07 DIAGNOSIS — A60.00 GENITAL HERPES SIMPLEX, UNSPECIFIED SITE: ICD-10-CM

## 2023-02-07 DIAGNOSIS — I10 ESSENTIAL HYPERTENSION: ICD-10-CM

## 2023-02-07 DIAGNOSIS — Z00.00 ANNUAL PHYSICAL EXAM: Primary | ICD-10-CM

## 2023-02-07 DIAGNOSIS — E78.2 HYPERLIPIDEMIA, MIXED: ICD-10-CM

## 2023-02-07 PROCEDURE — 99397 PER PM REEVAL EST PAT 65+ YR: CPT | Mod: S$GLB,,, | Performed by: INTERNAL MEDICINE

## 2023-02-07 PROCEDURE — 99999 PR PBB SHADOW E&M-EST. PATIENT-LVL III: CPT | Mod: PBBFAC,,, | Performed by: INTERNAL MEDICINE

## 2023-02-07 PROCEDURE — 99397 PR PREVENTIVE VISIT,EST,65 & OVER: ICD-10-PCS | Mod: S$GLB,,, | Performed by: INTERNAL MEDICINE

## 2023-02-07 PROCEDURE — 99999 PR PBB SHADOW E&M-EST. PATIENT-LVL III: ICD-10-PCS | Mod: PBBFAC,,, | Performed by: INTERNAL MEDICINE

## 2023-02-07 NOTE — PROGRESS NOTES
[facility] Note    Chief Complaint      Chief Complaint   Patient presents with    Follow-up     6M       History of Present Illness      Kierra Acosta is a 67 y.o. female with chronic conditions of HTN, HLD, Genital herpes who presents today for: annual preventative visit.  Had ER visit for right flank/sciatic pain.  Treated with etodolac and tramadol.    HTN: BP at goal on amlodipine, olmesartan-hctz.  HLD: Controlled on atorvastatin.  .    Genital herpes: Uses acyclovir and hydrocortisone cream as needed.     Diet: Prepares own food mostly.  Limiting fatty foods and carbs.  Drinks plenty water  Exercise: Needs to increase.  Discussed stretching exercises today  Denies drinking and driving, drinking more than 4 drinks on occasion, drug use.    FLu shot declines.  Td declines.  Shingles vaccine declines.  Pneumonia vaccine due age 66.  COVID vaccine UTD w/booster.  Mammogram 8/2022 with Dr. Felder.  PAP smear UTD. DEXA UTD 2022  Cscope UTD 2020 with Dr. Azevedo, no polyps, 10 yr interval.     Past Medical History:  Past Medical History:   Diagnosis Date    Hives        Past Surgical History:   has a past surgical history that includes Colonoscopy (03/04/2020).    Family History:  family history includes Hyperlipidemia in her mother; Hypertension in her mother; No Known Problems in her father.     Social History:  Social History     Tobacco Use    Smoking status: Never    Smokeless tobacco: Never   Substance Use Topics    Alcohol use: Yes     Comment: May have 1-2. Do not drink every week. Beer/wine    Drug use: Never       I personally reviewed all past medical, surgical, social and family history.    Review of Systems   Constitutional:  Negative for chills, fever and malaise/fatigue.   Respiratory:  Negative for shortness of breath.    Cardiovascular:  Negative for chest pain.   Gastrointestinal:  Negative for constipation, diarrhea, nausea and vomiting.   Skin:  Negative for  rash.   Neurological:  Negative for weakness.   All other systems reviewed and are negative.     Medications:  Outpatient Encounter Medications as of 2/7/2023   Medication Sig Dispense Refill    acyclovir (ZOVIRAX) 400 MG tablet Take 1 tablet (400 mg total) by mouth once daily. 90 tablet 3    amLODIPine (NORVASC) 5 MG tablet TAKE 1 TABLET(5 MG) BY MOUTH EVERY DAY 90 tablet 3    atorvastatin (LIPITOR) 10 MG tablet TAKE 1 TABLET(10 MG) BY MOUTH EVERY DAY 90 tablet 3    olmesartan-hydrochlorothiazide (BENICAR HCT) 20-12.5 mg per tablet Take 1 tablet by mouth once daily. 90 tablet 3    traMADoL (ULTRAM) 50 mg tablet Take 1 tablet (50 mg total) by mouth every 6 (six) hours as needed for Pain. 28 tablet 0    triamcinolone acetonide 0.1% (KENALOG) 0.1 % cream MANAS EXT AA BID FOR UP TO TWO WEEKS TO RASH ON BODY      [DISCONTINUED] ALPRAZolam (XANAX) 0.5 MG tablet Take 1 tablet (0.5 mg total) by mouth 2 (two) times daily as needed for Anxiety. (Patient not taking: Reported on 2/7/2023) 15 tablet 0    [DISCONTINUED] fluticasone propionate (FLONASE) 50 mcg/actuation nasal spray 1 spray (50 mcg total) by Each Nostril route once daily. (Patient not taking: Reported on 2/7/2023) 16 g 1    [DISCONTINUED] levocetirizine (XYZAL) 5 MG tablet Take 5 mg by mouth nightly as needed.        No facility-administered encounter medications on file as of 2/7/2023.       Allergies:  Review of patient's allergies indicates:   Allergen Reactions    Sulfamethoxazole-trimethoprim      Other reaction(s): Cutaneous eruption (morphologic abnormality)       Health Maintenance:  Immunization History   Administered Date(s) Administered    COVID-19, MRNA, LN-S, PF (Pfizer) (Purple Cap) 02/10/2021, 03/03/2021, 09/23/2021, 06/11/2022      Health Maintenance   Topic Date Due    TETANUS VACCINE  Never done    Mammogram  08/29/2023    DEXA Scan  08/29/2025    Lipid Panel  02/02/2028    Hepatitis C Screening  Completed        Physical Exam      Vital  "Signs  Pulse: 74  SpO2: 98 %  BP: 136/84  Pain Score: 0-No pain  Height and Weight  Height: 5' 3" (160 cm)  Weight: 71.2 kg (156 lb 15.5 oz)  BSA (Calculated - sq m): 1.78 sq meters  BMI (Calculated): 27.8  Weight in (lb) to have BMI = 25: 140.8]    Physical Exam  Vitals reviewed.   Constitutional:       Appearance: She is well-developed.   HENT:      Head: Normocephalic and atraumatic.      Right Ear: External ear normal.      Left Ear: External ear normal.   Cardiovascular:      Rate and Rhythm: Normal rate and regular rhythm.      Heart sounds: Normal heart sounds. No murmur heard.  Pulmonary:      Effort: Pulmonary effort is normal.      Breath sounds: Normal breath sounds. No wheezing or rales.   Abdominal:      General: Bowel sounds are normal. There is no distension.      Palpations: Abdomen is soft.      Tenderness: There is no abdominal tenderness.        Laboratory:  CBC:  Recent Labs   Lab 02/09/21 0825 02/01/22  0809 02/02/23  0646   WBC 8.62 8.2 8.6   RBC 4.63 4.77 4.85   Hemoglobin 12.7 13.3 13.8   Hematocrit 40.0 40.7 41.6   Platelets 243 224 248   MCV 86 85.3 85.8   MCH 27.4 27.9 28.5   MCHC 31.8 L 32.7 33.2     CMP:  Recent Labs   Lab 02/09/21  0825 02/01/22  0809 02/02/23  0646   Glucose 103 84 81   Calcium 9.7 9.2 9.4   Albumin 4.1 4.0 3.9   Total Protein 7.7 6.8 6.8   Sodium 142 141 141   Potassium 3.8 4.3 3.9   CO2 32 H 28 31   Chloride 103 104 103   BUN 16 15 20   Alkaline Phosphatase 97  --   --    ALT 20 15 13   AST 72 H 18 16   Total Bilirubin 0.5 0.8 0.6     URINALYSIS:       LIPIDS:  Recent Labs   Lab 02/09/21  0825 02/09/21  0826 02/01/22  0809 02/02/23  0646   TSH  --  1.840  --  1.46   HDL 52  --  54 53   Cholesterol 175  --  169 193   Triglycerides 54  --  73 103   LDL Cholesterol 112.2  --  99 119 H   HDL/Cholesterol Ratio 29.7  --  3.1 3.6   Non-HDL Cholesterol 123  --   --   --    Non HDL Chol. (LDL+VLDL)  --   --  115 140 H   Total Cholesterol/HDL Ratio 3.4  --   --   --  "     TSH:  Recent Labs   Lab 02/09/21  0826 02/02/23  0646   TSH 1.840 1.46     A1C:        Assessment/Plan     Kierra Acosta is a 67 y.o.female with:    1. Annual physical exam  Discussed diet and exercise, vaccines and cancer screening, risk factors.  Screening labs ordered.     2. Essential hypertension  Continue current meds.    3. Hyperlipidemia, mixed  Continue current meds.    4. Genital herpes simplex, unspecified site      Chronic conditions status updated as per HPI.  Other than changes above, cont current medications and maintain follow up with specialists.  No follow-ups on file.    No future appointments.    Chon Sauer MD  [facility]

## 2023-02-09 ENCOUNTER — PATIENT MESSAGE (OUTPATIENT)
Dept: PRIMARY CARE CLINIC | Facility: CLINIC | Age: 68
End: 2023-02-09
Payer: COMMERCIAL

## 2023-02-09 RX ORDER — ETODOLAC 200 MG/1
400 CAPSULE ORAL 2 TIMES DAILY
COMMUNITY
End: 2023-02-09 | Stop reason: SDUPTHER

## 2023-02-09 RX ORDER — ETODOLAC 200 MG/1
400 CAPSULE ORAL 2 TIMES DAILY
Qty: 60 CAPSULE | Refills: 1 | Status: SHIPPED | OUTPATIENT
Start: 2023-02-09

## 2023-04-05 DIAGNOSIS — A60.00 GENITAL HERPES SIMPLEX, UNSPECIFIED SITE: ICD-10-CM

## 2023-04-05 DIAGNOSIS — E78.2 HYPERLIPIDEMIA, MIXED: ICD-10-CM

## 2023-04-05 RX ORDER — ACYCLOVIR 400 MG/1
TABLET ORAL
Qty: 90 TABLET | Refills: 3 | Status: SHIPPED | OUTPATIENT
Start: 2023-04-05

## 2023-04-05 NOTE — TELEPHONE ENCOUNTER
Refill Decision Note   Kierra Acosta  is requesting a refill authorization.  Brief Assessment and Rationale for Refill:  Approve     Medication Therapy Plan:         Comments:     Note composed:7:39 AM 04/05/2023             Appointments     Last Visit   2/7/2023 Chon Sauer MD   Next Visit   Visit date not found Chon Sauer MD

## 2023-04-05 NOTE — TELEPHONE ENCOUNTER
No new care gaps identified.  [facility] Embedded Care Gaps. Reference number: [id number]. 4/05/2023   6:14:13 AM CDT

## 2023-04-06 RX ORDER — ATORVASTATIN CALCIUM 10 MG/1
10 TABLET, FILM COATED ORAL DAILY
Qty: 90 TABLET | Refills: 3 | Status: SHIPPED | OUTPATIENT
Start: 2023-04-06

## 2023-04-06 NOTE — TELEPHONE ENCOUNTER
Refill Decision Note   Kierra Acosta  is requesting a refill authorization.  Brief Assessment and Rationale for Refill:  Approve     Medication Therapy Plan:         Comments:     Note composed:2:29 PM 04/06/2023             Appointments     Last Visit   2/7/2023 Chon Sauer MD   Next Visit   Visit date not found Chon Sauer MD

## 2023-07-05 ENCOUNTER — TELEPHONE (OUTPATIENT)
Dept: PRIMARY CARE CLINIC | Facility: CLINIC | Age: 68
End: 2023-07-05
Payer: COMMERCIAL

## 2023-07-05 NOTE — TELEPHONE ENCOUNTER
----- Message from Charito Kyle MA sent at 7/5/2023  4:09 PM CDT -----  Type:  Patient Returning Call    Who Called: pt  Who Left Message for Patient: Nissa  Does the patient know what this is regarding?: yes  Would the patient rather a call back or a response via [facility]? Call back  Best Call Back Number: [phone]  Additional Information: pt returning call from staff, requesting a call back

## 2023-07-05 NOTE — TELEPHONE ENCOUNTER
Pt advised provider will not be in office and will call back next week if symptoms do not improve

## 2023-07-19 ENCOUNTER — OFFICE VISIT (OUTPATIENT)
Dept: PRIMARY CARE CLINIC | Facility: CLINIC | Age: 68
End: 2023-07-19
Payer: COMMERCIAL

## 2023-07-19 VITALS
BODY MASS INDEX: 27.45 KG/M2 | DIASTOLIC BLOOD PRESSURE: 90 MMHG | WEIGHT: 155 LBS | SYSTOLIC BLOOD PRESSURE: 162 MMHG | HEART RATE: 78 BPM | OXYGEN SATURATION: 100 %

## 2023-07-19 DIAGNOSIS — R05.1 ACUTE COUGH: ICD-10-CM

## 2023-07-19 DIAGNOSIS — I10 ESSENTIAL HYPERTENSION: ICD-10-CM

## 2023-07-19 DIAGNOSIS — J40 BRONCHITIS: Primary | ICD-10-CM

## 2023-07-19 PROCEDURE — 3008F BODY MASS INDEX DOCD: CPT | Mod: CPTII,S$GLB,, | Performed by: NURSE PRACTITIONER

## 2023-07-19 PROCEDURE — 3080F DIAST BP >= 90 MM HG: CPT | Mod: CPTII,S$GLB,, | Performed by: NURSE PRACTITIONER

## 2023-07-19 PROCEDURE — 3080F PR MOST RECENT DIASTOLIC BLOOD PRESSURE >= 90 MM HG: ICD-10-PCS | Mod: CPTII,S$GLB,, | Performed by: NURSE PRACTITIONER

## 2023-07-19 PROCEDURE — 99999 PR PBB SHADOW E&M-EST. PATIENT-LVL IV: CPT | Mod: PBBFAC,,, | Performed by: NURSE PRACTITIONER

## 2023-07-19 PROCEDURE — 3008F PR BODY MASS INDEX (BMI) DOCUMENTED: ICD-10-PCS | Mod: CPTII,S$GLB,, | Performed by: NURSE PRACTITIONER

## 2023-07-19 PROCEDURE — 99214 PR OFFICE/OUTPT VISIT, EST, LEVL IV, 30-39 MIN: ICD-10-PCS | Mod: S$GLB,,, | Performed by: NURSE PRACTITIONER

## 2023-07-19 PROCEDURE — 3077F PR MOST RECENT SYSTOLIC BLOOD PRESSURE >= 140 MM HG: ICD-10-PCS | Mod: CPTII,S$GLB,, | Performed by: NURSE PRACTITIONER

## 2023-07-19 PROCEDURE — 1159F PR MEDICATION LIST DOCUMENTED IN MEDICAL RECORD: ICD-10-PCS | Mod: CPTII,S$GLB,, | Performed by: NURSE PRACTITIONER

## 2023-07-19 PROCEDURE — 99999 PR PBB SHADOW E&M-EST. PATIENT-LVL IV: ICD-10-PCS | Mod: PBBFAC,,, | Performed by: NURSE PRACTITIONER

## 2023-07-19 PROCEDURE — 99214 OFFICE O/P EST MOD 30 MIN: CPT | Mod: S$GLB,,, | Performed by: NURSE PRACTITIONER

## 2023-07-19 PROCEDURE — 1160F RVW MEDS BY RX/DR IN RCRD: CPT | Mod: CPTII,S$GLB,, | Performed by: NURSE PRACTITIONER

## 2023-07-19 PROCEDURE — 1160F PR REVIEW ALL MEDS BY PRESCRIBER/CLIN PHARMACIST DOCUMENTED: ICD-10-PCS | Mod: CPTII,S$GLB,, | Performed by: NURSE PRACTITIONER

## 2023-07-19 PROCEDURE — 3077F SYST BP >= 140 MM HG: CPT | Mod: CPTII,S$GLB,, | Performed by: NURSE PRACTITIONER

## 2023-07-19 PROCEDURE — 1159F MED LIST DOCD IN RCRD: CPT | Mod: CPTII,S$GLB,, | Performed by: NURSE PRACTITIONER

## 2023-07-19 RX ORDER — CODEINE PHOSPHATE AND GUAIFENESIN 10; 100 MG/5ML; MG/5ML
5 SOLUTION ORAL 3 TIMES DAILY PRN
Qty: 140 ML | Refills: 0 | Status: SHIPPED | OUTPATIENT
Start: 2023-07-19 | End: 2023-07-29

## 2023-07-19 RX ORDER — METHYLPREDNISOLONE 4 MG/1
TABLET ORAL
Qty: 21 EACH | Refills: 0 | Status: SHIPPED | OUTPATIENT
Start: 2023-07-19 | End: 2023-08-09

## 2023-07-19 RX ORDER — AZITHROMYCIN 250 MG/1
TABLET, FILM COATED ORAL
Qty: 6 TABLET | Refills: 0 | Status: SHIPPED | OUTPATIENT
Start: 2023-07-19 | End: 2023-07-24

## 2023-07-19 NOTE — PROGRESS NOTES
[facility] Note    Chief Complaint      Chief Complaint   Patient presents with    Cough    Wheezing     History of Present Illness      Kierra Acosta is a 67 y.o. female patient of Dr. Pabon who presents today for chest congestion, cough and wheezing over the past 3 weeks. Pt reports is wheezing at night, coughing up a small amount of yellow phlegm. Has been taking delsym and mucinex w/o much relief. No fever, chills, sob, cp n/v/d    Health Maintenance   Topic Date Due    TETANUS VACCINE  Never done    Mammogram  08/29/2023    DEXA Scan  08/29/2025    Lipid Panel  02/02/2028    Hepatitis C Screening  Completed       Past Medical History:   Diagnosis Date    Hives        Past Surgical History:   Procedure Laterality Date    COLONOSCOPY  03/04/2020       family history includes Hyperlipidemia in her mother; Hypertension in her mother; No Known Problems in her father.    Social History     Tobacco Use    Smoking status: Never    Smokeless tobacco: Never   Substance Use Topics    Alcohol use: Yes     Comment: May have 1-2. Do not drink every week. Beer/wine    Drug use: Never       Review of Systems   Constitutional:  Positive for malaise/fatigue. Negative for chills and fever.   Respiratory:  Positive for cough, sputum production and wheezing. Negative for shortness of breath.    Cardiovascular:  Negative for chest pain and palpitations.   Gastrointestinal:  Negative for nausea and vomiting.   Genitourinary:  Negative for dysuria.   Musculoskeletal:  Negative for back pain.   Neurological:  Negative for dizziness and headaches.      Outpatient Encounter Medications as of 7/19/2023   Medication Sig Dispense Refill    acyclovir (ZOVIRAX) 400 MG tablet TAKE 1 TABLET(400 MG) BY MOUTH EVERY DAY 90 tablet 3    amLODIPine (NORVASC) 5 MG tablet TAKE 1 TABLET(5 MG) BY MOUTH EVERY DAY 90 tablet 3    atorvastatin (LIPITOR) 10 MG tablet Take 1 tablet (10 mg total) by mouth once daily. 90 tablet 3     etodolac (LODINE) 200 MG Cap Take 2 capsules (400 mg total) by mouth 2 (two) times daily. 60 capsule 1    olmesartan-hydrochlorothiazide (BENICAR HCT) 20-12.5 mg per tablet Take 1 tablet by mouth once daily. 90 tablet 3    traMADoL (ULTRAM) 50 mg tablet Take 1 tablet (50 mg total) by mouth every 6 (six) hours as needed for Pain. 28 tablet 0    triamcinolone acetonide 0.1% (KENALOG) 0.1 % cream MANAS EXT AA BID FOR UP TO TWO WEEKS TO RASH ON BODY      azithromycin (Z-SIRISHA) 250 MG tablet Take 2 tablets by mouth on day 1; Take 1 tablet by mouth on days 2-5 6 tablet 0    guaiFENesin-codeine 100-10 mg/5 ml (TUSSI-ORGANIDIN NR)  mg/5 mL syrup Take 5 mLs by mouth 3 (three) times daily as needed for Cough. 140 mL 0    methylPREDNISolone (MEDROL DOSEPACK) 4 mg tablet use as directed 21 each 0     No facility-administered encounter medications on file as of 7/19/2023.        Review of patient's allergies indicates:   Allergen Reactions    Sulfamethoxazole-trimethoprim      Other reaction(s): Cutaneous eruption (morphologic abnormality)       Physical Exam      Vital Signs  Pulse: 78  SpO2: 100 %  BP: (!) 162/90  Height and Weight  Weight: 70.3 kg (154 lb 15.7 oz)    Physical Exam  Vitals and nursing note reviewed.   Constitutional:       General: She is not in acute distress.     Appearance: Normal appearance. She is ill-appearing.   HENT:      Head: Normocephalic and atraumatic.   Cardiovascular:      Rate and Rhythm: Normal rate and regular rhythm.      Heart sounds: Normal heart sounds.   Pulmonary:      Effort: Pulmonary effort is normal.      Breath sounds: Wheezing and rhonchi present.   Musculoskeletal:         General: Normal range of motion.   Skin:     General: Skin is warm and dry.   Neurological:      General: No focal deficit present.      Mental Status: She is alert and oriented to person, place, and time.   Psychiatric:         Mood and Affect: Mood normal.         Behavior: Behavior normal.         Thought  Content: Thought content normal.         Judgment: Judgment normal.        Laboratory:  CBC:  Lab Results   Component Value Date    WBC 8.6 02/02/2023    RBC 4.85 02/02/2023    HGB 13.8 02/02/2023    HCT 41.6 02/02/2023     02/02/2023    MCV 85.8 02/02/2023    MCH 28.5 02/02/2023    MCHC 33.2 02/02/2023    MCHC 32.7 02/01/2022    MCHC 31.8 (L) 02/09/2021     CMP:  Lab Results   Component Value Date    GLU 81 02/02/2023    CALCIUM 9.4 02/02/2023    ALBUMIN 3.9 02/02/2023    PROT 6.8 02/02/2023     02/02/2023    K 3.9 02/02/2023    CO2 31 02/02/2023     02/02/2023    BUN 20 02/02/2023    ALKPHOS 97 02/09/2021    ALT 13 02/02/2023    AST 16 02/02/2023    BILITOT 0.6 02/02/2023    BILITOT 0.8 02/01/2022    BILITOT 0.5 02/09/2021     URINALYSIS:  No results found for: COLORU, CLARITYU, SPECGRAV, PHUR, PROTEINUA, GLUCOSEU, BILIRUBINCON, BLOODU, WBCU, RBCU, BACTERIA, MUCUS, NITRITE, LEUKOCYTESUR, UROBILINOGEN, HYALINECASTS   LIPIDS:  Lab Results   Component Value Date    TSH 1.46 02/02/2023    TSH 1.840 02/09/2021    TSH 2.07 02/07/2020    HDL 53 02/02/2023    HDL 54 02/01/2022    HDL 52 02/09/2021    CHOL 193 02/02/2023    CHOL 169 02/01/2022    CHOL 175 02/09/2021    TRIG 103 02/02/2023    TRIG 73 02/01/2022    TRIG 54 02/09/2021    LDLCALC 119 (H) 02/02/2023    LDLCALC 99 02/01/2022    LDLCALC 112.2 02/09/2021    CHOLHDL 3.6 02/02/2023    CHOLHDL 3.1 02/01/2022    CHOLHDL 29.7 02/09/2021    NONHDLCHOL 140 (H) 02/02/2023    NONHDLCHOL 115 02/01/2022    NONHDLCHOL 123 02/09/2021    TOTALCHOLEST 3.4 02/09/2021     TSH:  Lab Results   Component Value Date    TSH 1.46 02/02/2023    TSH 1.840 02/09/2021    TSH 2.07 02/07/2020     A1C:  No results found for: HGBA1C      Assessment/Plan     Kierra Chalo Dave is a 67 y.o.female with:    Bronchitis  -     azithromycin (Z-SIRISHA) 250 MG tablet; Take 2 tablets by mouth on day 1; Take 1 tablet by mouth on days 2-5  Dispense: 6 tablet; Refill: 0  -      methylPREDNISolone (MEDROL DOSEPACK) 4 mg tablet; use as directed  Dispense: 21 each; Refill: 0  -     guaiFENesin-codeine 100-10 mg/5 ml (TUSSI-ORGANIDIN NR)  mg/5 mL syrup; Take 5 mLs by mouth 3 (three) times daily as needed for Cough.  Dispense: 140 mL; Refill: 0    Acute cough  -     azithromycin (Z-SIRISHA) 250 MG tablet; Take 2 tablets by mouth on day 1; Take 1 tablet by mouth on days 2-5  Dispense: 6 tablet; Refill: 0  -     methylPREDNISolone (MEDROL DOSEPACK) 4 mg tablet; use as directed  Dispense: 21 each; Refill: 0  -     guaiFENesin-codeine 100-10 mg/5 ml (TUSSI-ORGANIDIN NR)  mg/5 mL syrup; Take 5 mLs by mouth 3 (three) times daily as needed for Cough.  Dispense: 140 mL; Refill: 0    Essential hypertension  -     azithromycin (Z-SIRISHA) 250 MG tablet; Take 2 tablets by mouth on day 1; Take 1 tablet by mouth on days 2-5  Dispense: 6 tablet; Refill: 0  -     methylPREDNISolone (MEDROL DOSEPACK) 4 mg tablet; use as directed  Dispense: 21 each; Refill: 0  -     guaiFENesin-codeine 100-10 mg/5 ml (TUSSI-ORGANIDIN NR)  mg/5 mL syrup; Take 5 mLs by mouth 3 (three) times daily as needed for Cough.  Dispense: 140 mL; Refill: 0        Stay hydrated with water and warm tea  Can take mucinex  Take meds as prescribed  Complete anbx    Health Maintenance Due   Topic Date Due    TETANUS VACCINE  Never done    Hemoglobin A1c (Diabetic Prevention Screening)  Never done    Shingles Vaccine (1 of 2) Never done    Pneumococcal Vaccines (Age 65+) (1 - PCV) Never done    COVID-19 Vaccine (5 - Pfizer series) 08/06/2022    Mammogram  08/29/2023        I spent 38 minutes on the day of this encounter for preparing for, evaluating, treating, and managing this patient.      -Continue current medications and maintain follow up with specialists.  Return to clinic as needed for any concerns   No follow-ups on file.       MELINDA Olson  [facility]

## 2023-11-13 DIAGNOSIS — I10 ESSENTIAL HYPERTENSION: ICD-10-CM

## 2023-11-13 RX ORDER — OLMESARTAN MEDOXOMIL AND HYDROCHLOROTHIAZIDE 20/12.5 20; 12.5 MG/1; MG/1
1 TABLET ORAL DAILY
Qty: 90 TABLET | Refills: 3 | Status: SHIPPED | OUTPATIENT
Start: 2023-11-13 | End: 2024-11-12

## 2023-11-13 RX ORDER — AMLODIPINE BESYLATE 5 MG/1
5 TABLET ORAL DAILY
Qty: 90 TABLET | Refills: 3 | Status: SHIPPED | OUTPATIENT
Start: 2023-11-13

## 2023-11-13 NOTE — TELEPHONE ENCOUNTER
Care Due:                  Date            Visit Type   Department     Provider  --------------------------------------------------------------------------------                                EP -                              PRIMARY      [facility]  Last Visit: 02-      CARE ([facility])   CARE           Chon Carreno                              EP -                              PRIMARY      [facility]  Next Visit: 02-      CARE ([facility])   CARE           Chon Carreno                                                            Last  Test          Frequency    Reason                     Performed    Due Date  --------------------------------------------------------------------------------    CBC.........  12 months..  etodolac.................  Not Found    Overdue    CMP.........  12 months..  atorvastatin, etodolac,    02- 01-                             olmesartan-hydrochlorothi                             azide....................    Lipid Panel.  12 months..  atorvastatin.............  02- 01-    [facility] Embedded Care Due Messages. Reference number: [id number].   11/13/2023 9:04:55 AM CST

## 2023-11-13 NOTE — TELEPHONE ENCOUNTER
Refill Routing Note   Medication(s) are not appropriate for processing by [facility] for the following reason(s):      Required vitals abnormal    [facility] action(s):  Defer Care Due:  None identified            Appointments  past 12m or future 3m with PCP    Date Provider   Last Visit   2/7/2023 Chon Sauer MD   Next Visit   2/7/2024 Chon Sauer MD   ED visits in past 90 days: 0        Note composed:2:07 PM 11/13/2023

## 2024-04-01 DIAGNOSIS — A60.00 GENITAL HERPES SIMPLEX, UNSPECIFIED SITE: ICD-10-CM

## 2024-04-01 DIAGNOSIS — E78.2 HYPERLIPIDEMIA, MIXED: ICD-10-CM

## 2024-04-01 RX ORDER — ATORVASTATIN CALCIUM 10 MG/1
10 TABLET, FILM COATED ORAL DAILY
Qty: 90 TABLET | Refills: 3 | Status: CANCELLED | OUTPATIENT
Start: 2024-04-01

## 2024-04-01 RX ORDER — ACYCLOVIR 400 MG/1
400 TABLET ORAL DAILY
Qty: 90 TABLET | Refills: 3 | Status: CANCELLED | OUTPATIENT
Start: 2024-04-01

## 2024-04-01 NOTE — TELEPHONE ENCOUNTER
Care Due:                  Date            Visit Type   Department     Provider  --------------------------------------------------------------------------------                                EP -                              PRIMARY      [facility]  Last Visit: 02-      CARE ([facility])   CARE           Chon Carreno  Next Visit: None Scheduled  None         None Found                                                            Last  Test          Frequency    Reason                     Performed    Due Date  --------------------------------------------------------------------------------    Office Visit  12 months..  amLODIPine, atorvastatin,   02- 02-                             etodolac,                             olmesartan-hydrochlorothi                             azide....................    CBC.........  12 months..  etodolac.................  Not Found    Overdue    CMP.........  12 months..  atorvastatin, etodolac,    02- 01-                             olmesartan-hydrochlorothi                             azide....................    Lipid Panel.  12 months..  atorvastatin.............  02- 01-    [facility] Embedded Care Due Messages. Reference number: [id number].   4/01/2024 4:56:49 PM CDT

## 2024-08-27 ENCOUNTER — PATIENT MESSAGE (OUTPATIENT)
Dept: PRIMARY CARE CLINIC | Facility: CLINIC | Age: 69
End: 2024-08-27
Payer: COMMERCIAL

## 2024-12-02 DIAGNOSIS — I10 ESSENTIAL HYPERTENSION: ICD-10-CM

## 2024-12-02 RX ORDER — AMLODIPINE BESYLATE 5 MG/1
5 TABLET ORAL
Qty: 90 TABLET | Refills: 3 | Status: SHIPPED | OUTPATIENT
Start: 2024-12-02

## 2024-12-02 NOTE — TELEPHONE ENCOUNTER
Refill Routing Note   Medication(s) are not appropriate for processing by [facility] for the following reason(s):        Required vitals outdated  Patient not seen by provider within 15 months  ED/Hospital Visit since last OV with provider  Responsible provider unclear    [facility] action(s):  Defer             Appointments  past 12m or future 3m with PCP    Date Provider   Last Visit   2/7/2023 Chon Sauer MD   Next Visit   Visit date not found Chon Sauer MD   ED visits in past 90 days: 0        Note composed:11:57 AM 12/02/2024